# Patient Record
Sex: MALE | Race: WHITE | Employment: FULL TIME | URBAN - METROPOLITAN AREA
[De-identification: names, ages, dates, MRNs, and addresses within clinical notes are randomized per-mention and may not be internally consistent; named-entity substitution may affect disease eponyms.]

---

## 2021-06-20 ENCOUNTER — HOSPITAL ENCOUNTER (EMERGENCY)
Facility: CLINIC | Age: 26
Discharge: HOME | End: 2021-06-20
Attending: EMERGENCY MEDICINE
Payer: COMMERCIAL

## 2021-06-20 ENCOUNTER — APPOINTMENT (OUTPATIENT)
Dept: GENERAL RADIOLOGY | Facility: CLINIC | Age: 26
End: 2021-06-20
Payer: COMMERCIAL

## 2021-06-20 VITALS
TEMPERATURE: 98 F | WEIGHT: 300 LBS | DIASTOLIC BLOOD PRESSURE: 98 MMHG | OXYGEN SATURATION: 100 % | SYSTOLIC BLOOD PRESSURE: 133 MMHG | RESPIRATION RATE: 20 BRPM | HEART RATE: 104 BPM

## 2021-06-20 DIAGNOSIS — S60.222A CONTUSION OF LEFT HAND, INITIAL ENCOUNTER: Primary | ICD-10-CM

## 2021-06-20 PROCEDURE — 73110 X-RAY EXAM OF WRIST: CPT

## 2021-06-20 PROCEDURE — 99284 EMERGENCY DEPT VISIT MOD MDM: CPT

## 2021-06-20 PROCEDURE — 73130 X-RAY EXAM OF HAND: CPT

## 2021-06-20 PROCEDURE — 6370000000 HC RX 637 (ALT 250 FOR IP): Performed by: EMERGENCY MEDICINE

## 2021-06-20 RX ORDER — IBUPROFEN 800 MG/1
800 TABLET ORAL ONCE
Status: COMPLETED | OUTPATIENT
Start: 2021-06-20 | End: 2021-06-20

## 2021-06-20 RX ADMIN — IBUPROFEN 800 MG: 800 TABLET, FILM COATED ORAL at 20:24

## 2021-06-20 ASSESSMENT — PAIN SCALES - GENERAL
PAINLEVEL_OUTOF10: 6
PAINLEVEL_OUTOF10: 3
PAINLEVEL_OUTOF10: 6

## 2021-06-20 ASSESSMENT — ENCOUNTER SYMPTOMS
SORE THROAT: 0
NAUSEA: 0
ABDOMINAL PAIN: 0
DIARRHEA: 0
SHORTNESS OF BREATH: 0
VOMITING: 0

## 2021-06-20 ASSESSMENT — PAIN DESCRIPTION - PROGRESSION: CLINICAL_PROGRESSION: GRADUALLY IMPROVING

## 2021-06-20 ASSESSMENT — PAIN DESCRIPTION - ORIENTATION: ORIENTATION: LEFT

## 2021-06-20 ASSESSMENT — PAIN DESCRIPTION - ONSET: ONSET: SUDDEN

## 2021-06-20 ASSESSMENT — PAIN DESCRIPTION - LOCATION: LOCATION: WRIST;HAND

## 2021-06-20 ASSESSMENT — PAIN DESCRIPTION - PAIN TYPE
TYPE: ACUTE PAIN
TYPE: ACUTE PAIN

## 2021-06-20 ASSESSMENT — PAIN DESCRIPTION - FREQUENCY: FREQUENCY: CONTINUOUS

## 2021-06-20 ASSESSMENT — PAIN DESCRIPTION - DESCRIPTORS: DESCRIPTORS: THROBBING

## 2021-06-21 NOTE — ED PROVIDER NOTES
INITIAL VITALS:  weight is 136.1 kg (300 lb). His oral temperature is 98 °F (36.7 °C). His blood pressure is 133/98 (abnormal) and his pulse is 104. His respiration is 20 and oxygen saturation is 100%. Physical Exam  Constitutional:       General: He is not in acute distress. Appearance: He is well-developed. HENT:      Head: Normocephalic and atraumatic. Right Ear: External ear normal.      Left Ear: External ear normal.   Eyes:      General: Lids are normal.         Right eye: No discharge. Left eye: No discharge. Neck:      Trachea: No tracheal deviation. Cardiovascular:      Rate and Rhythm: Normal rate and regular rhythm. Pulmonary:      Effort: Pulmonary effort is normal.   Abdominal:      Palpations: Abdomen is soft. Tenderness: There is no abdominal tenderness. Musculoskeletal:      Comments: Left dorsal hand has tenderness to the fourth and fifth metacarpals. No obvious deformity. Mild edema to that region. No significant wrist or other hand tenderness. Rest of arm is normal.  Normal distal pulses, motor and sensation. Limited range of motion due to pain. Otherwise unremarkable hand exam.   Skin:     General: Skin is warm and dry. Neurological:      Mental Status: He is alert. GCS: GCS eye subscore is 4. GCS verbal subscore is 5. GCS motor subscore is 6. Psychiatric:         Behavior: Behavior normal.           DIFFERENTIAL DIAGNOSIS/ MDM:     Plan to be to image the hand and wrist.  We will also give ibuprofen.     DIAGNOSTIC RESULTS     EKG: All EKG's are interpreted by the Emergency Department Physician who either signs or Co-signs this chart in the absence of a cardiologist.        RADIOLOGY:   Interpretation per the Radiologist below, if available at the time of this note:  XR WRIST LEFT (MIN 3 VIEWS)   Final Result   No acute bony abnormalities are noted         XR HAND LEFT (MIN 3 VIEWS)   Final Result   No acute bony abnormalities are noted No results found. LABS:  No results found for this visit on 06/20/21. EMERGENCY DEPARTMENT COURSE:     The patient was given the following medications:  Orders Placed This Encounter   Medications    ibuprofen (ADVIL;MOTRIN) tablet 800 mg        Vitals:    Vitals:    06/20/21 2002   BP: (!) 133/98   Pulse: 104   Resp: 20   Temp: 98 °F (36.7 °C)   TempSrc: Oral   SpO2: 100%   Weight: 136.1 kg (300 lb)     -------------------------  BP: (!) 133/98, Temp: 98 °F (36.7 °C), Pulse: 104, Resp: 20      Re-evaluation Notes    Patient doing well on reevaluation. No acute changes. Imaging negative for acute findings of fracture. I feel this is a hand contusion. Advised to use supportive care and NSAIDs and to follow-up with his PCP return sooner if worsening. He is comfortable with the plan. The patient presented with hand pain. A fracture was not detected on X-ray. The wrist and elbow joints were not affected. No skin lesions were seen. There are no signs of compartment syndrome. The pulses are 2/4. The motor is 5/5. The sensation is intact. The patient was advised to return to the Emergency Department for increasing pain, numbness, weakness, or coldness to the extremity. The patient was further instructed to follow up in 2-3 days with their family doctor or orthopedics. The patient voiced understanding of these instructions. The patient understands that at this time there is no evidence for a more malignant underlying process, but the patient also understands that early in the process of an illness or injury, an emergency department workup can be falsely reassuring. Routine discharge counseling was given, and the patient understands that worsening, changing or persistent symptoms should prompt an immediate call or follow up with their primary physician or return to the emergency department. The importance of appropriate follow up was also discussed.   I have reviewed the disposition diagnosis with the patient and or their family/guardian. I have answered their questions and given discharge instructions. They voiced understanding of these instructions and did not have any further questions or complaints. CONSULTS:    None    CRITICAL CARE:     None    PROCEDURES:    None    FINAL IMPRESSION      1. Contusion of left hand, initial encounter          DISPOSITION/PLAN   DISPOSITION Decision To Discharge 06/20/2021 09:26:39 PM      Condition on Disposition    Improved    PATIENT REFERRED TO:  Your doctor    Schedule an appointment as soon as possible for a visit in 3 days        DISCHARGE MEDICATIONS:  There are no discharge medications for this patient.       (Please note that portions of this note were completed with a voice recognition program.  Efforts were made to edit the dictations but occasionally words are mis-transcribed.)    Isaiah Mooney DO, DO  Attending Emergency Physician        Isaiah Mooney DO  06/21/21 0365

## 2021-06-21 NOTE — ED NOTES
Pt. Ambulatory to room # 12 from waiting area, gait steady. Pt. C/o left hand and wrist pain after punching a wall today. Pt. States he has fractured hand in the past from punching a wall. Pt. C/o left hand and left wrist pain. Pt. States he has not taken anything for pain. Pt. Alert and oriented x4. RR equal and non labored. NaD noted. Call light within reach.       Nithin Harris RN  06/20/21 2119

## 2021-07-22 ENCOUNTER — HOSPITAL ENCOUNTER (EMERGENCY)
Facility: CLINIC | Age: 26
Discharge: HOME | End: 2021-07-22
Attending: EMERGENCY MEDICINE
Payer: MEDICARE

## 2021-07-22 ENCOUNTER — APPOINTMENT (OUTPATIENT)
Dept: GENERAL RADIOLOGY | Facility: CLINIC | Age: 26
End: 2021-07-22
Payer: MEDICARE

## 2021-07-22 ENCOUNTER — NURSE TRIAGE (OUTPATIENT)
Dept: OTHER | Facility: CLINIC | Age: 26
End: 2021-07-22

## 2021-07-22 VITALS
BODY MASS INDEX: 33.33 KG/M2 | OXYGEN SATURATION: 97 % | DIASTOLIC BLOOD PRESSURE: 79 MMHG | SYSTOLIC BLOOD PRESSURE: 141 MMHG | TEMPERATURE: 98.4 F | RESPIRATION RATE: 16 BRPM | HEART RATE: 98 BPM | WEIGHT: 225 LBS | HEIGHT: 69 IN

## 2021-07-22 DIAGNOSIS — S46.912A STRAIN OF LEFT SHOULDER, INITIAL ENCOUNTER: Primary | ICD-10-CM

## 2021-07-22 PROCEDURE — 73030 X-RAY EXAM OF SHOULDER: CPT

## 2021-07-22 PROCEDURE — 96372 THER/PROPH/DIAG INJ SC/IM: CPT

## 2021-07-22 PROCEDURE — 99283 EMERGENCY DEPT VISIT LOW MDM: CPT

## 2021-07-22 PROCEDURE — 6360000002 HC RX W HCPCS: Performed by: EMERGENCY MEDICINE

## 2021-07-22 RX ORDER — KETOROLAC TROMETHAMINE 30 MG/ML
30 INJECTION, SOLUTION INTRAMUSCULAR; INTRAVENOUS ONCE
Status: COMPLETED | OUTPATIENT
Start: 2021-07-22 | End: 2021-07-22

## 2021-07-22 RX ORDER — CYCLOBENZAPRINE HCL 10 MG
10 TABLET ORAL 3 TIMES DAILY PRN
Qty: 30 TABLET | Refills: 0 | Status: SHIPPED | OUTPATIENT
Start: 2021-07-22 | End: 2021-08-01

## 2021-07-22 RX ORDER — ORPHENADRINE CITRATE 30 MG/ML
60 INJECTION INTRAMUSCULAR; INTRAVENOUS ONCE
Status: COMPLETED | OUTPATIENT
Start: 2021-07-22 | End: 2021-07-22

## 2021-07-22 RX ADMIN — ORPHENADRINE CITRATE 60 MG: 30 INJECTION INTRAMUSCULAR; INTRAVENOUS at 20:24

## 2021-07-22 RX ADMIN — KETOROLAC TROMETHAMINE 30 MG: 30 INJECTION, SOLUTION INTRAMUSCULAR at 20:24

## 2021-07-22 ASSESSMENT — ENCOUNTER SYMPTOMS
NAUSEA: 0
DIARRHEA: 0
SHORTNESS OF BREATH: 0
VOMITING: 0
ABDOMINAL PAIN: 0
SORE THROAT: 0

## 2021-07-22 ASSESSMENT — PAIN DESCRIPTION - ORIENTATION: ORIENTATION: RIGHT

## 2021-07-22 ASSESSMENT — PAIN SCALES - GENERAL
PAINLEVEL_OUTOF10: 10
PAINLEVEL_OUTOF10: 10

## 2021-07-22 ASSESSMENT — PAIN DESCRIPTION - LOCATION: LOCATION: SHOULDER

## 2021-07-22 ASSESSMENT — PAIN DESCRIPTION - PAIN TYPE: TYPE: ACUTE PAIN

## 2021-07-22 NOTE — TELEPHONE ENCOUNTER
[facility] [employer] [facility]. Brief description of triage: Pt c/o left shoulder pain that has been exacerbated from prior injury 4-5 years ago. Pt states he having severe pain and is unable to move arm without pain. Pt states he took Marijuana and alcohol to help with the pain. Pt has an appt with new PCP on 07/26/21. See assessment below. Triage indicates for patient to be seen today by PCP. Pt advised to be seen in the ED or [facility] today. Care advice provided, patient verbalizes understanding; denies any other questions or concerns; instructed to call back for any new or worsening symptoms. Attention Provider: Thank you for allowing me to participate in the care of your patient. The patient was connected to triage in response to information provided to the ECC. Please do not respond through this encounter as the response is not directed to a shared pool. Reason for Disposition   SEVERE pain (e.g., excruciating, unable to do any normal activities)    Answer Assessment - Initial Assessment Questions  1. ONSET: \"When did the pain start? \"      Yesterday    2. LOCATION: \"Where is the pain located? \"      Left shoulder    3. PAIN: \"How bad is the pain? \" (Scale 1-10; or mild, moderate, severe)    - MILD (1-3): doesn't interfere with normal activities    - MODERATE (4-7): interferes with normal activities (e.g., work or school) or awakens from sleep    - SEVERE (8-10): excruciating pain, unable to do any normal activities, unable to move arm at all due to pain      10    4. WORK OR EXERCISE: \"Has there been any recent work or exercise that involved this part of the body? \"      Yesterday during fishing    5. CAUSE: \"What do you think is causing the shoulder pain? \"      Pt states had 4-5 years ago injured it changing a tire    6. OTHER SYMPTOMS: \"Do you have any other symptoms? \" (e.g., neck pain, swelling, rash, fever, numbness, weakness)     Pain in left arm    7. PREGNANCY: \"Is there any chance you are pregnant? \" \"When was your last menstrual period? \"      na    Protocols used: SHOULDER PAIN-ADULT-[place]

## 2021-07-22 NOTE — ED NOTES
Mode of arrival (squad #, walk in, police, etc) : walk in- dropped off        Chief complaint(s): left shoulder pain          Arrival Note (brief scenario, treatment PTA, etc). : Pt reports that 4-5 years ago he torn his left rotator cuff, reports that a couple days ago he re-injured his arm while fishing. Pt reports that the pain became to bad and reports inability to move his left arm.         C= \"Have you ever felt that you should Cut down on your drinking? \"  No  A= \"Have people Annoyed you by criticizing your drinking? \"  No  G= \"Have you ever felt bad or Guilty about your drinking? \"  No  E= \"Have you ever had a drink as an Eye-opener first thing in the morning to steady your nerves or to help a hangover? \"  No      Deferred []      Reason for deferring: N/A    *If yes to two or more: probable alcohol abuse. Susan You RN  07/22/21 4052

## 2021-07-23 NOTE — ED PROVIDER NOTES
[facility]  Phone: [phone] [facility] [place],[address]          Pt Name: Arlen Monzon  MRN: [id number]  [place] 1995  Date of evaluation: 7/22/2021      CHIEF COMPLAINT       Chief Complaint   Patient presents with    Shoulder Pain     left shoulder        HISTORY OF PRESENT ILLNESS       Arlen Monzon is a 27 y.o. male who presents with left shoulder pain. Had a rotator cuff injury about 5 years ago and was told he needed surgery but he did not have insurance so it was never repaired. He states he sometimes has intermittent flareups of pain with the shoulder and this is one of them. He did arrive with a left shoulder sling. He drinks some alcohol at home and smoked marijuana to help with the pain. He did not take any NSAIDs because he did not have any available. Denies obvious injury other than possible overuse injury. Denies other symptoms, injuries or concerns. REVIEW OF SYSTEMS       Review of Systems   Constitutional: Negative for chills and fever. HENT: Negative for sore throat. Respiratory: Negative for shortness of breath. Cardiovascular: Negative for chest pain. Gastrointestinal: Negative for abdominal pain, diarrhea, nausea and vomiting. Musculoskeletal: Negative for neck pain. Skin: Negative for rash. Neurological: Negative for weakness and numbness. PAST MEDICAL HISTORY    has no past medical history on file. SURGICAL HISTORY      has a past surgical history that includes Boswell tooth extraction. CURRENT MEDICATIONS       Previous Medications    No medications on file       ALLERGIES     is allergic to pcn [penicillins]. FAMILY HISTORY     has no family status information on file. family history is not on file. SOCIAL HISTORY      reports that he has been smoking e-cigarettes and cigarettes. He has been smoking about 0.75 packs per day.  He has never used smokeless tobacco. He reports current alcohol use. He reports current drug use. Drug: Marijuana. PHYSICAL EXAM     INITIAL VITALS:  height is 5' 9\" (1.753 m) and weight is 102.1 kg (225 lb). His oral temperature is 98.4 °F (36.9 °C). His blood pressure is 141/79 (abnormal) and his pulse is 98. His respiration is 16 and oxygen saturation is 97%. Physical Exam  Constitutional:       General: He is not in acute distress. Appearance: He is well-developed. HENT:      Head: Normocephalic and atraumatic. Right Ear: External ear normal.      Left Ear: External ear normal.   Eyes:      General: Lids are normal.         Right eye: No discharge. Left eye: No discharge. Neck:      Trachea: No tracheal deviation. Cardiovascular:      Rate and Rhythm: Normal rate and regular rhythm. Pulmonary:      Effort: Pulmonary effort is normal.   Abdominal:      Palpations: Abdomen is soft. Tenderness: There is no abdominal tenderness. Musculoskeletal:      Comments: Limited range of motion to the left shoulder due to discomfort. He does have normal axillary nerve function. Normal distal pulses, motor and sensation. No evidence of neurovascular compromise distally. Neck and back exam are unremarkable. Rest of musculoskeletal exam is within normal limits. Skin:     General: Skin is warm and dry. Neurological:      Mental Status: He is alert. GCS: GCS eye subscore is 4. GCS verbal subscore is 5. GCS motor subscore is 6. Psychiatric:         Behavior: Behavior normal.           DIFFERENTIAL DIAGNOSIS/ MDM:     Plan will be to image the shoulder and refer to orthopedics. DIAGNOSTIC RESULTS     EKG: All EKG's are interpreted by the Emergency Department Physician who either signs or Co-signs this chart in the absence of a cardiologist.        RADIOLOGY:   Interpretation per the Radiologist below, if available at the time of this note:  XR SHOULDER LEFT (MIN 2 VIEWS)   Final Result   No acute abnormality. No results found. LABS:  No results found for this visit on 07/22/21. EMERGENCY DEPARTMENT COURSE:     The patient was given the following medications:  Orders Placed This Encounter   Medications    ketorolac (TORADOL) injection 30 mg    orphenadrine (NORFLEX) injection 60 mg    diclofenac (VOLTAREN) 50 MG EC tablet     Sig: Take 1 tablet by mouth 2 times daily     Dispense:  30 tablet     Refill:  0    cyclobenzaprine (FLEXERIL) 10 MG tablet     Sig: Take 1 tablet by mouth 3 times daily as needed for Muscle spasms     Dispense:  30 tablet     Refill:  0        Vitals:    Vitals:    07/22/21 1946 07/22/21 1947   BP: (!) 141/79    Pulse: 98    Resp:  16   Temp: 98.4 °F (36.9 °C)    TempSrc: Oral    SpO2: 97%    Weight: 102.1 kg (225 lb)    Height: 5' 9\" (1.753 m)      -------------------------  BP: (!) 141/79, Temp: 98.4 °F (36.9 °C), Pulse: 98, Resp: 16      Re-evaluation Notes    Patient doing well on reevaluation. X-ray negative. Will discharge home with orthopedic follow-up. He was advised to follow-up with orthopedics or return right away if worsening or for new or concerning symptoms. He is comfortable with the plan. He already has his own sling. Prescribed muscle relaxers and Voltaren. He is comfortable with this plan. The patient presented with shoulder pain. A fracture was not detected on X-ray. The elbow and wrist joints were not affected. No skin lesions were seen. There are no signs of compartment syndrome. The pulses are 2/4. The motor is 5/5. The sensation is intact. The patient was advised to return to the Emergency Department for increasing pain, numbness, weakness, or coldness to the extremity. The patient was further instructed to follow up in 2-3 days with their family doctor or orthopedics. The patient voiced understanding of these instructions.     The patient understands that at this time there is no evidence for a more malignant underlying process, but the patient also understands that early in the process of an illness or injury, an emergency department workup can be falsely reassuring. Routine discharge counseling was given, and the patient understands that worsening, changing or persistent symptoms should prompt an immediate call or follow up with their primary physician or return to the emergency department. The importance of appropriate follow up was also discussed. I have reviewed the disposition diagnosis with the patient and or their family/guardian. I have answered their questions and given discharge instructions. They voiced understanding of these instructions and did not have any further questions or complaints. CONSULTS:    None    CRITICAL CARE:     None    PROCEDURES:    None    FINAL IMPRESSION      1.  Strain of left shoulder, initial encounter          DISPOSITION/PLAN   DISPOSITION Decision To Discharge 07/22/2021 09:05:48 PM      Condition on Disposition    Improved    PATIENT REFERRED TO:  Fabienne Tabares   [phone]    Schedule an appointment as soon as possible for a visit in 3 days        DISCHARGE MEDICATIONS:  New Prescriptions    CYCLOBENZAPRINE (FLEXERIL) 10 MG TABLET    Take 1 tablet by mouth 3 times daily as needed for Muscle spasms    DICLOFENAC (VOLTAREN) 50 MG EC TABLET    Take 1 tablet by mouth 2 times daily       (Please note that portions of this note were completed with a voice recognition program.  Efforts were made to edit the dictations but occasionally words are mis-transcribed.)    Matthew Martinez DO, DO  Attending Emergency Physician       Matthew Martinez DO  07/22/21 7784

## 2021-07-26 ENCOUNTER — HOSPITAL ENCOUNTER (OUTPATIENT)
Age: 26
Setting detail: SPECIMEN
Discharge: HOME | End: 2021-07-26
Payer: MEDICARE

## 2021-07-26 ENCOUNTER — OFFICE VISIT (OUTPATIENT)
Dept: FAMILY MEDICINE CLINIC | Age: 26
End: 2021-07-26
Payer: MEDICARE

## 2021-07-26 VITALS
HEART RATE: 95 BPM | WEIGHT: 234 LBS | OXYGEN SATURATION: 99 % | HEIGHT: 69 IN | SYSTOLIC BLOOD PRESSURE: 128 MMHG | BODY MASS INDEX: 34.66 KG/M2 | DIASTOLIC BLOOD PRESSURE: 76 MMHG | RESPIRATION RATE: 15 BRPM | TEMPERATURE: 97.3 F

## 2021-07-26 DIAGNOSIS — Z00.00 ENCOUNTER FOR MEDICAL EXAMINATION TO ESTABLISH CARE: ICD-10-CM

## 2021-07-26 DIAGNOSIS — J45.20 MILD INTERMITTENT ASTHMA WITHOUT COMPLICATION: Primary | ICD-10-CM

## 2021-07-26 DIAGNOSIS — Z71.6 ENCOUNTER FOR SMOKING CESSATION COUNSELING: ICD-10-CM

## 2021-07-26 DIAGNOSIS — M75.112 NONTRAUMATIC INCOMPLETE TEAR OF LEFT ROTATOR CUFF: ICD-10-CM

## 2021-07-26 LAB
HCT VFR BLD CALC: 48 % (ref 40.7–50.3)
HEMOGLOBIN: 15.5 G/DL (ref 13–17)
MCH RBC QN AUTO: 32.2 PG (ref 25.2–33.5)
MCHC RBC AUTO-ENTMCNC: 32.3 G/DL (ref 28.4–34.8)
MCV RBC AUTO: 99.6 FL (ref 82.6–102.9)
NRBC AUTOMATED: 0 PER 100 WBC
PDW BLD-RTO: 13.5 % (ref 11.8–14.4)
PLATELET # BLD: 260 K/UL (ref 138–453)
PMV BLD AUTO: 10.3 FL (ref 8.1–13.5)
RBC # BLD: 4.82 M/UL (ref 4.21–5.77)
WBC # BLD: 7.5 K/UL (ref 3.5–11.3)

## 2021-07-26 PROCEDURE — 99204 OFFICE O/P NEW MOD 45 MIN: CPT | Performed by: STUDENT IN AN ORGANIZED HEALTH CARE EDUCATION/TRAINING PROGRAM

## 2021-07-26 PROCEDURE — G8417 CALC BMI ABV UP PARAM F/U: HCPCS | Performed by: STUDENT IN AN ORGANIZED HEALTH CARE EDUCATION/TRAINING PROGRAM

## 2021-07-26 PROCEDURE — G8427 DOCREV CUR MEDS BY ELIG CLIN: HCPCS | Performed by: STUDENT IN AN ORGANIZED HEALTH CARE EDUCATION/TRAINING PROGRAM

## 2021-07-26 PROCEDURE — 4004F PT TOBACCO SCREEN RCVD TLK: CPT | Performed by: STUDENT IN AN ORGANIZED HEALTH CARE EDUCATION/TRAINING PROGRAM

## 2021-07-26 RX ORDER — BUDESONIDE AND FORMOTEROL FUMARATE DIHYDRATE 160; 4.5 UG/1; UG/1
2 AEROSOL RESPIRATORY (INHALATION) 2 TIMES DAILY
Qty: 3 INHALER | Refills: 1 | Status: SHIPPED | OUTPATIENT
Start: 2021-07-26

## 2021-07-26 RX ORDER — ALBUTEROL SULFATE 90 UG/1
2 AEROSOL, METERED RESPIRATORY (INHALATION) EVERY 6 HOURS PRN
COMMUNITY
End: 2021-07-26 | Stop reason: SDUPTHER

## 2021-07-26 RX ORDER — ALBUTEROL SULFATE 90 UG/1
2 AEROSOL, METERED RESPIRATORY (INHALATION) EVERY 6 HOURS PRN
Qty: 1 INHALER | Refills: 1 | Status: SHIPPED | OUTPATIENT
Start: 2021-07-26 | End: 2022-07-26

## 2021-07-26 RX ORDER — VARENICLINE TARTRATE
KIT
Qty: 1 BOX | Refills: 1 | Status: SHIPPED | OUTPATIENT
Start: 2021-07-26

## 2021-07-26 SDOH — ECONOMIC STABILITY: HOUSING INSECURITY
IN THE LAST 12 MONTHS, WAS THERE A TIME WHEN YOU DID NOT HAVE A STEADY PLACE TO SLEEP OR SLEPT IN A SHELTER (INCLUDING NOW)?: NO

## 2021-07-26 SDOH — ECONOMIC STABILITY: FOOD INSECURITY: WITHIN THE PAST 12 MONTHS, YOU WORRIED THAT YOUR FOOD WOULD RUN OUT BEFORE YOU GOT MONEY TO BUY MORE.: NEVER TRUE

## 2021-07-26 SDOH — ECONOMIC STABILITY: TRANSPORTATION INSECURITY
IN THE PAST 12 MONTHS, HAS THE LACK OF TRANSPORTATION KEPT YOU FROM MEDICAL APPOINTMENTS OR FROM GETTING MEDICATIONS?: NO

## 2021-07-26 SDOH — HEALTH STABILITY: PHYSICAL HEALTH: ON AVERAGE, HOW MANY MINUTES DO YOU ENGAGE IN EXERCISE AT THIS LEVEL?: 0 MIN

## 2021-07-26 SDOH — ECONOMIC STABILITY: INCOME INSECURITY: IN THE LAST 12 MONTHS, WAS THERE A TIME WHEN YOU WERE NOT ABLE TO PAY THE MORTGAGE OR RENT ON TIME?: NO

## 2021-07-26 SDOH — ECONOMIC STABILITY: TRANSPORTATION INSECURITY
IN THE PAST 12 MONTHS, HAS LACK OF TRANSPORTATION KEPT YOU FROM MEETINGS, WORK, OR FROM GETTING THINGS NEEDED FOR DAILY LIVING?: NO

## 2021-07-26 SDOH — HEALTH STABILITY: PHYSICAL HEALTH: ON AVERAGE, HOW MANY DAYS PER WEEK DO YOU ENGAGE IN MODERATE TO STRENUOUS EXERCISE (LIKE A BRISK WALK)?: 0 DAYS

## 2021-07-26 SDOH — ECONOMIC STABILITY: FOOD INSECURITY: WITHIN THE PAST 12 MONTHS, THE FOOD YOU BOUGHT JUST DIDN'T LAST AND YOU DIDN'T HAVE MONEY TO GET MORE.: NEVER TRUE

## 2021-07-26 ASSESSMENT — SOCIAL DETERMINANTS OF HEALTH (SDOH)
HOW HARD IS IT FOR YOU TO PAY FOR THE VERY BASICS LIKE FOOD, HOUSING, MEDICAL CARE, AND HEATING?: NOT HARD AT ALL
HOW OFTEN DO YOU ATTENT MEETINGS OF THE CLUB OR ORGANIZATION YOU BELONG TO?: NEVER
WITHIN THE LAST YEAR, HAVE YOU BEEN AFRAID OF YOUR PARTNER OR EX-PARTNER?: NO
WITHIN THE LAST YEAR, HAVE YOU BEEN KICKED, HIT, SLAPPED, OR OTHERWISE PHYSICALLY HURT BY YOUR PARTNER OR EX-PARTNER?: NO
HOW OFTEN DO YOU ATTEND CHURCH OR RELIGIOUS SERVICES?: NEVER
HOW OFTEN DO YOU GET TOGETHER WITH FRIENDS OR RELATIVES?: ONCE A WEEK
WITHIN THE LAST YEAR, HAVE YOU BEEN HUMILIATED OR EMOTIONALLY ABUSED IN OTHER WAYS BY YOUR PARTNER OR EX-PARTNER?: NO
IN A TYPICAL WEEK, HOW MANY TIMES DO YOU TALK ON THE PHONE WITH FAMILY, FRIENDS, OR NEIGHBORS?: ONCE A WEEK
WITHIN THE LAST YEAR, HAVE TO BEEN RAPED OR FORCED TO HAVE ANY KIND OF SEXUAL ACTIVITY BY YOUR PARTNER OR EX-PARTNER?: NO
DO YOU BELONG TO ANY CLUBS OR ORGANIZATIONS SUCH AS CHURCH GROUPS UNIONS, FRATERNAL OR ATHLETIC GROUPS, OR SCHOOL GROUPS?: NO

## 2021-07-26 ASSESSMENT — LIFESTYLE VARIABLES
HOW OFTEN DO YOU HAVE A DRINK CONTAINING ALCOHOL: 2-4 TIMES A MONTH
HOW MANY STANDARD DRINKS CONTAINING ALCOHOL DO YOU HAVE ON A TYPICAL DAY: 1 OR 2

## 2021-07-26 ASSESSMENT — PATIENT HEALTH QUESTIONNAIRE - PHQ9
SUM OF ALL RESPONSES TO PHQ QUESTIONS 1-9: 0
SUM OF ALL RESPONSES TO PHQ9 QUESTIONS 1 & 2: 0
2. FEELING DOWN, DEPRESSED OR HOPELESS: NOT AT ALL
DEPRESSION UNABLE TO ASSESS: YES
2. FEELING DOWN, DEPRESSED OR HOPELESS: 0
SUM OF ALL RESPONSES TO PHQ9 QUESTIONS 1 & 2: 0
1. LITTLE INTEREST OR PLEASURE IN DOING THINGS: 0
1. LITTLE INTEREST OR PLEASURE IN DOING THINGS: NOT AT ALL
SUM OF ALL RESPONSES TO PHQ QUESTIONS 1-9: 0
SUM OF ALL RESPONSES TO PHQ QUESTIONS 1-9: 0

## 2021-07-26 NOTE — PROGRESS NOTES
2021    Jose Aggarwal (:  1995) is a 27 y.o. male, here for evaluation of the following medical concerns:    HPI  Asthma:  Current treatment includes inhaled steroids. Using preventive medication(s) consistently: no- as needed. Residual symptoms: none. Patient denies any other symptoms. He requires his rescue inhaler 1 time(s) per week. Presents as new patient    Has hx of partially torn rotator cuff on the left    Has not had PT    No MRI      Review of Systems 12 pt ROS per HPI nighttime cough, some wheezing    Prior to Visit Medications    Medication Sig Taking? Authorizing Provider   albuterol sulfate HFA (VENTOLIN HFA) 108 (90 Base) MCG/ACT inhaler Inhale 2 puffs into the lungs every 6 hours as needed for Wheezing or Shortness of Breath Yes Renetta Mann MD   Spacer/Aero-Holding Chambers JORGE 1 Device by Does not apply route daily as needed (with inhaler) Yes Renetta Mann MD   budesonide-formoterol (SYMBICORT) 160-4.5 MCG/ACT AERO Inhale 2 puffs into the lungs 2 times daily Yes Renetta Mann MD   varenicline (CHANTIX STARTING MONTH ) 0.5 MG X 11 & 1 MG X 42 tablet Take by mouth.  Yes Renetta Mann MD   diclofenac (VOLTAREN) 50 MG EC tablet Take 1 tablet by mouth 2 times daily Yes Chano Shook DO   cyclobenzaprine (FLEXERIL) 10 MG tablet Take 1 tablet by mouth 3 times daily as needed for Muscle spasms Yes Donovan Shook DO        Allergies   Allergen Reactions    Pcn [Penicillins] Anaphylaxis       Past Medical History:   Diagnosis Date    Asthma        Past Surgical History:   Procedure Laterality Date    WISDOM TOOTH EXTRACTION         Social History     Socioeconomic History    Marital status: Single     Spouse name: Not on file    Number of children: Not on file    Years of education: Not on file    Highest education level: Not on file   Occupational History    Not on file   Tobacco Use    Smoking status: Current Every Day Smoker Packs/day: 0.75     Years: 17.00     Pack years: 12.75     Types: E-Cigarettes, Cigarettes     Start date: 2005    Smokeless tobacco: Never Used   Vaping Use    Vaping Use: Every day    Start date: 7/1/2005    Substances: Nicotine, THC    Passive vaping exposure Yes   Substance and Sexual Activity    Alcohol use: Yes     Comment: 1-3 times per week    Drug use: Yes     Frequency: 7.0 times per week     Types: Marijuana    Sexual activity: Not Currently   Other Topics Concern    Not on file   Social History Narrative    Not on file     Social Determinants of Health     Financial Resource Strain: Low Risk     Difficulty of Paying Living Expenses: Not hard at all   Food Insecurity: No Food Insecurity    Worried About Running Out of Food in the Last Year: Never true    Forrest of Food in the Last Year: Never true   Transportation Needs: No Transportation Needs    Lack of Transportation (Medical): No    Lack of Transportation (Non-Medical):  No   Physical Activity: Inactive    Days of Exercise per Week: 0 days    Minutes of Exercise per Session: 0 min   Stress: No Stress Concern Present    Feeling of Stress : Not at all   Social Connections: Socially Isolated    Frequency of Communication with Friends and Family: Once a week    Frequency of Social Gatherings with Friends and Family: Once a week    Attends Episcopal Services: Never    Active Member of Clubs or Organizations: No    Attends Club or Organization Meetings: Never    Marital Status:    Intimate Partner Violence: Not At Risk    Fear of Current or Ex-Partner: No    Emotionally Abused: No    Physically Abused: No    Sexually Abused: No        Family History   Problem Relation Age of Onset    Heart Disease Mother     Diabetes Mother     Kidney Disease Mother     Heart Disease Father     Diabetes Father        Vitals:    07/26/21 1436   BP: 128/76   Site: Right Upper Arm   Position: Sitting   Cuff Size: Large Adult   Pulse: 95 Resp: 15   Temp: 97.3 °F (36.3 °C)   TempSrc: Temporal   SpO2: 99%   Weight: 234 lb (106.1 kg)   Height: 5' 9\" (1.753 m)     Estimated body mass index is 34.56 kg/m² as calculated from the following:    Height as of this encounter: 5' 9\" (1.753 m). Weight as of this encounter: 234 lb (106.1 kg). Physical Exam  Vitals and nursing note reviewed. Constitutional:       Appearance: Normal appearance. HENT:      Head: Normocephalic and atraumatic. Eyes:      Extraocular Movements: Extraocular movements intact. Cardiovascular:      Rate and Rhythm: Normal rate and regular rhythm. Pulses: Normal pulses. Heart sounds: Normal heart sounds. Pulmonary:      Effort: Pulmonary effort is normal.      Breath sounds: Normal breath sounds. Abdominal:      General: Abdomen is flat. Palpations: Abdomen is soft. Musculoskeletal:         General: Normal range of motion. Cervical back: Normal range of motion and neck supple. Skin:     General: Skin is warm. Neurological:      General: No focal deficit present. Mental Status: He is alert and oriented to person, place, and time. ASSESSMENT/PLAN:  1. Mild intermittent asthma without complication    - albuterol sulfate HFA (VENTOLIN HFA) 108 (90 Base) MCG/ACT inhaler; Inhale 2 puffs into the lungs every 6 hours as needed for Wheezing or Shortness of Breath  Dispense: 1 Inhaler; Refill: 1  - Spacer/Aero-Holding Chambers JORGE; 1 Device by Does not apply route daily as needed (with inhaler)  Dispense: 1 Device; Refill: 0  - budesonide-formoterol (SYMBICORT) 160-4.5 MCG/ACT AERO; Inhale 2 puffs into the lungs 2 times daily  Dispense: 3 Inhaler; Refill: 1    2. Nontraumatic incomplete tear of left rotator cuff    - External Referral To Physical Therapy    3. Encounter for smoking cessation counseling    - varenicline (CHANTIX STARTING MONTH PAK) 0.5 MG X 11 & 1 MG X 42 tablet; Take by mouth. Dispense: 1 box; Refill: 1    4.  Encounter for medical examination to establish care    - Basic Metabolic Panel; Future  - CBC; Future    PT link rotator cuff  Chantix ordered  Stop vaping  Less unhealthy food - more salads - exercise  Symbicort added as control therapy  Once he quits smoking breathing status will improve  May add montelukast in winter    No follow-ups on file. An  electronic signature was used to authenticate this note.     --Chikis Yeboah MD on 7/26/2021 at 3:04 PM

## 2021-07-27 LAB
ANION GAP SERPL CALCULATED.3IONS-SCNC: 15 MMOL/L (ref 9–17)
BUN BLDV-MCNC: 16 MG/DL (ref 6–20)
BUN/CREAT BLD: NORMAL (ref 9–20)
CALCIUM SERPL-MCNC: 9.9 MG/DL (ref 8.6–10.4)
CHLORIDE BLD-SCNC: 102 MMOL/L (ref 98–107)
CO2: 21 MMOL/L (ref 20–31)
CREAT SERPL-MCNC: 0.99 MG/DL (ref 0.7–1.2)
GFR AFRICAN AMERICAN: >60 ML/MIN
GFR NON-AFRICAN AMERICAN: >60 ML/MIN
GFR SERPL CREATININE-BSD FRML MDRD: NORMAL ML/MIN/{1.73_M2}
GFR SERPL CREATININE-BSD FRML MDRD: NORMAL ML/MIN/{1.73_M2}
GLUCOSE BLD-MCNC: 79 MG/DL (ref 70–99)
POTASSIUM SERPL-SCNC: 5.2 MMOL/L (ref 3.7–5.3)
SODIUM BLD-SCNC: 138 MMOL/L (ref 135–144)

## 2021-08-28 ENCOUNTER — APPOINTMENT (OUTPATIENT)
Dept: GENERAL RADIOLOGY | Facility: CLINIC | Age: 26
End: 2021-08-28
Payer: MEDICARE

## 2021-08-28 ENCOUNTER — HOSPITAL ENCOUNTER (EMERGENCY)
Facility: CLINIC | Age: 26
Discharge: HOME | End: 2021-08-28
Attending: EMERGENCY MEDICINE
Payer: MEDICARE

## 2021-08-28 VITALS
SYSTOLIC BLOOD PRESSURE: 151 MMHG | DIASTOLIC BLOOD PRESSURE: 76 MMHG | TEMPERATURE: 98 F | OXYGEN SATURATION: 98 % | WEIGHT: 240 LBS | BODY MASS INDEX: 35.55 KG/M2 | HEART RATE: 110 BPM | RESPIRATION RATE: 18 BRPM | HEIGHT: 69 IN

## 2021-08-28 DIAGNOSIS — S93.602A SPRAIN OF LEFT FOOT, INITIAL ENCOUNTER: Primary | ICD-10-CM

## 2021-08-28 PROCEDURE — 73630 X-RAY EXAM OF FOOT: CPT

## 2021-08-28 PROCEDURE — 99282 EMERGENCY DEPT VISIT SF MDM: CPT

## 2021-08-28 ASSESSMENT — PAIN SCALES - GENERAL: PAINLEVEL_OUTOF10: 9
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
## 2022-05-04 ENCOUNTER — APPOINTMENT (OUTPATIENT)
Dept: GENERAL RADIOLOGY | Facility: CLINIC | Age: 27
End: 2022-05-04
Payer: MEDICARE

## 2022-05-04 ENCOUNTER — HOSPITAL ENCOUNTER (EMERGENCY)
Facility: CLINIC | Age: 27
Discharge: HOME | End: 2022-05-04
Attending: EMERGENCY MEDICINE
Payer: MEDICARE

## 2022-05-04 VITALS
OXYGEN SATURATION: 97 % | WEIGHT: 250 LBS | DIASTOLIC BLOOD PRESSURE: 96 MMHG | HEIGHT: 69 IN | BODY MASS INDEX: 37.03 KG/M2 | SYSTOLIC BLOOD PRESSURE: 130 MMHG | RESPIRATION RATE: 16 BRPM | TEMPERATURE: 98.1 F | HEART RATE: 99 BPM
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
## 2022-05-04 DIAGNOSIS — M25.512 LEFT SHOULDER PAIN, UNSPECIFIED CHRONICITY: ICD-10-CM

## 2022-05-04 PROCEDURE — 6360000002 HC RX W HCPCS: Performed by: EMERGENCY MEDICINE

## 2022-05-04 PROCEDURE — 96372 THER/PROPH/DIAG INJ SC/IM: CPT

## 2022-05-04 PROCEDURE — 99284 EMERGENCY DEPT VISIT MOD MDM: CPT

## 2022-05-04 PROCEDURE — 73030 X-RAY EXAM OF SHOULDER: CPT

## 2022-05-04 RX ORDER — KETOROLAC TROMETHAMINE 30 MG/ML
30 INJECTION, SOLUTION INTRAMUSCULAR; INTRAVENOUS ONCE
Status: COMPLETED | OUTPATIENT
Start: 2022-05-04 | End: 2022-05-04

## 2022-05-04 RX ORDER — PREDNISONE 10 MG/1
10 TABLET ORAL SEE ADMIN INSTRUCTIONS
Qty: 17 TABLET | Refills: 0 | Status: SHIPPED | OUTPATIENT
Start: 2022-05-04 | End: 2022-05-10

## 2022-05-04 RX ORDER — KETOROLAC TROMETHAMINE 10 MG/1
10 TABLET, FILM COATED ORAL EVERY 6 HOURS PRN
Qty: 20 TABLET | Refills: 0 | Status: SHIPPED | OUTPATIENT
Start: 2022-05-04

## 2022-05-04 RX ADMIN — KETOROLAC TROMETHAMINE 30 MG: 30 INJECTION, SOLUTION INTRAMUSCULAR at 18:36

## 2022-05-04 ASSESSMENT — PAIN - FUNCTIONAL ASSESSMENT: PAIN_FUNCTIONAL_ASSESSMENT: 0-10

## 2022-05-04 ASSESSMENT — PAIN SCALES - GENERAL
PAINLEVEL_OUTOF10: 7
PAINLEVEL_OUTOF10: 7

## 2022-05-04 ASSESSMENT — PAIN DESCRIPTION - DESCRIPTORS: DESCRIPTORS: ACHING

## 2022-05-04 ASSESSMENT — PAIN DESCRIPTION - LOCATION: LOCATION: SHOULDER
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
## 2023-02-01 ENCOUNTER — HOSPITAL ENCOUNTER (EMERGENCY)
Age: 28
Discharge: HOME | End: 2023-02-01
Attending: EMERGENCY MEDICINE
Payer: MEDICAID

## 2023-02-01 ENCOUNTER — APPOINTMENT (OUTPATIENT)
Dept: CT IMAGING | Age: 28
End: 2023-02-01
Payer: MEDICAID

## 2023-02-01 VITALS
HEIGHT: 69 IN | RESPIRATION RATE: 16 BRPM | BODY MASS INDEX: 35.99 KG/M2 | WEIGHT: 243 LBS | OXYGEN SATURATION: 100 % | DIASTOLIC BLOOD PRESSURE: 77 MMHG | HEART RATE: 77 BPM | TEMPERATURE: 97.7 F | SYSTOLIC BLOOD PRESSURE: 134 MMHG

## 2023-02-01 DIAGNOSIS — R10.32 LEFT LOWER QUADRANT ABDOMINAL PAIN: Primary | ICD-10-CM

## 2023-02-01 LAB
ABSOLUTE EOS #: 0.1 K/UL (ref 0–0.4)
ABSOLUTE LYMPH #: 2.3 K/UL (ref 1–4.8)
ABSOLUTE MONO #: 0.6 K/UL (ref 0.1–1.2)
ALBUMIN SERPL-MCNC: 4.8 G/DL (ref 3.5–5.2)
ALBUMIN/GLOBULIN RATIO: 1.7 (ref 1–2.5)
ALP SERPL-CCNC: 65 U/L (ref 40–129)
ALT SERPL-CCNC: 43 U/L (ref 5–41)
ANION GAP SERPL CALCULATED.3IONS-SCNC: 15 MMOL/L (ref 9–17)
AST SERPL-CCNC: 24 U/L
BASOPHILS # BLD: 1 % (ref 0–2)
BASOPHILS ABSOLUTE: 0.1 K/UL (ref 0–0.2)
BILIRUB SERPL-MCNC: 0.5 MG/DL (ref 0.3–1.2)
BILIRUBIN URINE: NEGATIVE
BUN SERPL-MCNC: 17 MG/DL (ref 6–20)
CALCIUM SERPL-MCNC: 9.5 MG/DL (ref 8.6–10.4)
CHLORIDE SERPL-SCNC: 99 MMOL/L (ref 98–107)
CO2 SERPL-SCNC: 23 MMOL/L (ref 20–31)
COLOR: YELLOW
COMMENT UA: NORMAL
CREAT SERPL-MCNC: 0.82 MG/DL (ref 0.7–1.2)
EOSINOPHILS RELATIVE PERCENT: 1 % (ref 1–4)
GFR SERPL CREATININE-BSD FRML MDRD: >60 ML/MIN/1.73M2
GLUCOSE SERPL-MCNC: 103 MG/DL (ref 70–99)
GLUCOSE UR STRIP.AUTO-MCNC: NEGATIVE MG/DL
HCT VFR BLD AUTO: 46.4 % (ref 41–53)
HGB BLD-MCNC: 16.1 G/DL (ref 13.5–17.5)
KETONES UR STRIP.AUTO-MCNC: NEGATIVE MG/DL
LEUKOCYTE ESTERASE UR QL STRIP.AUTO: NEGATIVE
LIPASE SERPL-CCNC: 33 U/L (ref 13–60)
LYMPHOCYTES # BLD: 25 % (ref 24–44)
MAGNESIUM SERPL-MCNC: 2.1 MG/DL (ref 1.6–2.6)
MCH RBC QN AUTO: 31.2 PG (ref 26–34)
MCHC RBC AUTO-ENTMCNC: 34.8 G/DL (ref 31–37)
MCV RBC AUTO: 89.8 FL (ref 80–100)
MONOCYTES # BLD: 7 % (ref 2–11)
NITRITE UR QL STRIP.AUTO: NEGATIVE
PDW BLD-RTO: 13.3 % (ref 12.5–15.4)
PLATELET # BLD AUTO: 283 K/UL (ref 140–450)
PMV BLD AUTO: 7.9 FL (ref 6–12)
POTASSIUM SERPL-SCNC: 3.5 MMOL/L (ref 3.7–5.3)
PROT SERPL-MCNC: 7.7 G/DL (ref 6.4–8.3)
PROT UR STRIP.AUTO-MCNC: 6.5 MG/DL (ref 5–8)
PROT UR STRIP.AUTO-MCNC: NEGATIVE MG/DL
RBC # BLD: 5.16 M/UL (ref 4.5–5.9)
SEG NEUTROPHILS: 66 % (ref 36–66)
SEGMENTED NEUTROPHILS ABSOLUTE COUNT: 6.2 K/UL (ref 1.8–7.7)
SODIUM SERPL-SCNC: 137 MMOL/L (ref 135–144)
SPECIFIC GRAVITY UA: 1.02 (ref 1–1.03)
TURBIDITY: CLEAR
URINE HGB: NEGATIVE
UROBILINOGEN, URINE: NORMAL
WBC # BLD AUTO: 9.4 K/UL (ref 3.5–11)

## 2023-02-01 PROCEDURE — A4216 STERILE WATER/SALINE, 10 ML: HCPCS | Performed by: EMERGENCY MEDICINE

## 2023-02-01 PROCEDURE — 36415 COLL VENOUS BLD VENIPUNCTURE: CPT

## 2023-02-01 PROCEDURE — 74176 CT ABD & PELVIS W/O CONTRAST: CPT

## 2023-02-01 PROCEDURE — 6360000002 HC RX W HCPCS: Performed by: EMERGENCY MEDICINE

## 2023-02-01 PROCEDURE — 83735 ASSAY OF MAGNESIUM: CPT

## 2023-02-01 PROCEDURE — 96375 TX/PRO/DX INJ NEW DRUG ADDON: CPT

## 2023-02-01 PROCEDURE — 81003 URINALYSIS AUTO W/O SCOPE: CPT

## 2023-02-01 PROCEDURE — 83690 ASSAY OF LIPASE: CPT

## 2023-02-01 PROCEDURE — 85025 COMPLETE CBC W/AUTO DIFF WBC: CPT

## 2023-02-01 PROCEDURE — 96374 THER/PROPH/DIAG INJ IV PUSH: CPT

## 2023-02-01 PROCEDURE — 2500000003 HC RX 250 WO HCPCS: Performed by: EMERGENCY MEDICINE

## 2023-02-01 PROCEDURE — 80053 COMPREHEN METABOLIC PANEL: CPT

## 2023-02-01 PROCEDURE — 99284 EMERGENCY DEPT VISIT MOD MDM: CPT

## 2023-02-01 PROCEDURE — 2580000003 HC RX 258: Performed by: EMERGENCY MEDICINE

## 2023-02-01 RX ORDER — IBUPROFEN 800 MG/1
800 TABLET ORAL EVERY 6 HOURS PRN
COMMUNITY
Start: 2022-06-14

## 2023-02-01 RX ORDER — BUSPIRONE HYDROCHLORIDE 10 MG/1
10 TABLET ORAL 3 TIMES DAILY
COMMUNITY

## 2023-02-01 RX ORDER — QUETIAPINE FUMARATE 100 MG/1
100 TABLET, FILM COATED ORAL NIGHTLY
COMMUNITY

## 2023-02-01 RX ORDER — KETOROLAC TROMETHAMINE 30 MG/ML
30 INJECTION, SOLUTION INTRAMUSCULAR; INTRAVENOUS ONCE
Status: COMPLETED | OUTPATIENT
Start: 2023-02-01 | End: 2023-02-01

## 2023-02-01 RX ORDER — HYDROXYZINE PAMOATE 25 MG/1
25 CAPSULE ORAL 3 TIMES DAILY PRN
COMMUNITY

## 2023-02-01 RX ORDER — ONDANSETRON 2 MG/ML
4 INJECTION INTRAMUSCULAR; INTRAVENOUS ONCE
Status: COMPLETED | OUTPATIENT
Start: 2023-02-01 | End: 2023-02-01

## 2023-02-01 RX ORDER — NAPROXEN 500 MG/1
500 TABLET ORAL 2 TIMES DAILY WITH MEALS
Qty: 30 TABLET | Refills: 0 | Status: SHIPPED | OUTPATIENT
Start: 2023-02-01 | End: 2023-02-16

## 2023-02-01 RX ORDER — LAMOTRIGINE 100 MG/1
100 TABLET ORAL DAILY
COMMUNITY

## 2023-02-01 RX ORDER — 0.9 % SODIUM CHLORIDE 0.9 %
1000 INTRAVENOUS SOLUTION INTRAVENOUS ONCE
Status: COMPLETED | OUTPATIENT
Start: 2023-02-01 | End: 2023-02-01

## 2023-02-01 RX ADMIN — ONDANSETRON 4 MG: 2 INJECTION INTRAMUSCULAR; INTRAVENOUS at 21:46

## 2023-02-01 RX ADMIN — KETOROLAC TROMETHAMINE 30 MG: 30 INJECTION, SOLUTION INTRAMUSCULAR; INTRAVENOUS at 21:46

## 2023-02-01 RX ADMIN — SODIUM CHLORIDE 1000 ML: 9 INJECTION, SOLUTION INTRAVENOUS at 21:45

## 2023-02-01 RX ADMIN — FAMOTIDINE 20 MG: 10 INJECTION, SOLUTION INTRAVENOUS at 21:48

## 2023-02-01 ASSESSMENT — ENCOUNTER SYMPTOMS
BLOOD IN STOOL: 0
DIARRHEA: 0
ABDOMINAL PAIN: 1
VOMITING: 0
NAUSEA: 1
SHORTNESS OF BREATH: 0
SORE THROAT: 0

## 2023-02-01 ASSESSMENT — PAIN SCALES - GENERAL
PAINLEVEL_OUTOF10: 7
PAINLEVEL_OUTOF10: 9

## 2023-02-01 ASSESSMENT — PAIN DESCRIPTION - ORIENTATION
ORIENTATION: LEFT;LOWER
ORIENTATION: LEFT;LOWER

## 2023-02-01 ASSESSMENT — PAIN DESCRIPTION - LOCATION
LOCATION: ABDOMEN
LOCATION: ABDOMEN

## 2023-02-01 ASSESSMENT — PAIN - FUNCTIONAL ASSESSMENT: PAIN_FUNCTIONAL_ASSESSMENT: 0-10

## 2023-02-01 NOTE — Clinical Note
Yuri Fernandez was seen and treated in our emergency department on 2/1/2023. He may return to work on 02/04/2023. If you have any questions or concerns, please don't hesitate to call.       Vinny Barney, DO

## 2023-02-02 NOTE — DISCHARGE INSTRUCTIONS
PLEASE RETURN TO THE EMERGENCY DEPARTMENT IMMEDIATELY if your symptoms worsen in anyway or in 8-12 hours if not improved for re-evaluation. You should immediately return to the ER for symptoms such as increasing pain, bloody stool, fever, a feeling of passing out, light headed, dizziness, chest pain, shortness of breath, persistent nausea and/or vomiting, numbness or weakness to the arms or legs, coolness or color change of the arms or legs. Take your medication as indicated and prescribed. If you are given an antibiotic then, make sure you get the prescription filled and take the antibiotics until finished. Please understand that at this time there is no evidence for a more serious underlying process, but that early in the process of an illness or injury, an emergency department workup can be falsely reassuring. You should contact your family doctor within the next 24 hours for a follow up appointment    Lui Monae!!!    From [address] and [facility]    On behalf of the Emergency Department staff at [address], I would like to thank you for giving us the opportunity to address your health care needs and concerns. We hope that during your visit, our service was delivered in a professional and caring manner. Please keep [address] in mind as we walk with you down the path to your own personal wellness. Please expect an automated text message or email from us so we can ask a few questions about your health and progress. Based on your answers, a clinician may call you back to offer help and instructions. Please understand that early in the process of an illness or injury, an emergency department workup can be falsely reassuring. If you notice any worsening, changing or persistent symptoms please call your family doctor or return to the ER immediately. Tell us how we did during your visit at [email or web address]   and let us know about your experience

## 2023-02-02 NOTE — ED PROVIDER NOTES
[facility] Emergency Department  [address] [facility] [address]  Phone: [phone]  Fax: [phone] [facility]          Pt Name: Juve Marin  MRN: [id number]  [place] 1995  Date of evaluation: 2/1/2023      CHIEF COMPLAINT       Chief Complaint   Patient presents with    Abdominal Pain    Back Pain     Pt arrives with co LLQ abdominal pain and bilateral lower back pain majority on the left which presented earlier today . Pt denies any abdominal history in the past including abdominal surgeries. Pt admits to nausea denies vomiting or diarrhea. Pt states he medicated with ibuprofen 600mg around [id number] today. Pt states he would only like tylenol or motrin for pain as he is in recovery. HISTORY OF PRESENT ILLNESS       Juve Marin is a 24 y.o. male who presents with left flank pain and left lower quadrant pain. States it feels like it started in the left lower quadrant. No history of kidney stones. No trauma. Movement makes it worse. It is intermittently sharp and stabbing in nature. No urinary symptoms. No diarrhea or constipation. No history of GI issues. No vomiting but intermittent nausea. No other GI or  symptoms or any other concerns. Symptoms began earlier today. REVIEW OF SYSTEMS       Review of Systems   Constitutional:  Negative for chills and fever. HENT:  Negative for sore throat. Respiratory:  Negative for shortness of breath. Cardiovascular:  Negative for chest pain. Gastrointestinal:  Positive for abdominal pain and nausea. Negative for blood in stool, diarrhea and vomiting. Genitourinary:  Negative for difficulty urinating, dysuria, scrotal swelling and testicular pain. Musculoskeletal:  Negative for neck pain. Skin:  Negative for rash. Neurological:  Negative for weakness and numbness. PAST MEDICAL HISTORY    has a past medical history of Asthma.     SURGICAL HISTORY      has a past surgical history that includes [place] tooth extraction. CURRENT MEDICATIONS       Previous Medications    ALBUTEROL SULFATE HFA (VENTOLIN HFA) 108 (90 BASE) MCG/ACT INHALER    Inhale 2 puffs into the lungs every 6 hours as needed for Wheezing or Shortness of Breath    BUDESONIDE-FORMOTEROL (SYMBICORT) 160-4.5 MCG/ACT AERO    Inhale 2 puffs into the lungs 2 times daily    BUSPIRONE (BUSPAR) 10 MG TABLET    Take 10 mg by mouth 3 times daily    HYDROXYZINE PAMOATE (VISTARIL) 25 MG CAPSULE    Take 25 mg by mouth 3 times daily as needed    IBUPROFEN (ADVIL;MOTRIN) 800 MG TABLET    Take 800 mg by mouth every 6 hours as needed    KETOROLAC (TORADOL) 10 MG TABLET    Take 1 tablet by mouth every 6 hours as needed for Pain    LAMOTRIGINE (LAMICTAL) 100 MG TABLET    Take 100 mg by mouth daily    QUETIAPINE (SEROQUEL) 100 MG TABLET    Take 100 mg by mouth nightly    SPACER/AERO-HOLDING CHAMBERS JORGE    1 Device by Does not apply route daily as needed (with inhaler)    VARENICLINE (CHANTIX STARTING MONTH ) 0.5 MG X 11 & 1 MG X 42 TABLET    Take by mouth. ALLERGIES     is allergic to pcn [penicillins]. FAMILY HISTORY     He indicated that his mother is . He indicated that his father is . family history includes Diabetes in his father and mother; Heart Disease in his father and mother; Kidney Disease in his mother. SOCIAL HISTORY      reports that he has been smoking e-cigarettes and cigarettes. He started smoking about 18 years ago. He has a 12.75 pack-year smoking history. He has never used smokeless tobacco. He reports that he does not currently use alcohol. He reports that he does not currently use drugs after having used the following drugs: Marijuana (Weed) and [employer] comments. Frequency: 7.00 times per week. PHYSICAL EXAM     INITIAL VITALS:  height is 5' 9\" (1.753 m) and weight is 110.2 kg (243 lb). His oral temperature is 97.7 °F (36.5 °C).  His blood pressure is 134/77 and his pulse is 77. His respiration is 16 and oxygen saturation is 100%. Physical Exam  Constitutional:       General: He is not in acute distress. Appearance: He is well-developed. HENT:      Head: Normocephalic and atraumatic. Right Ear: External ear normal.      Left Ear: External ear normal.   Eyes:      General: Lids are normal.         Right eye: No discharge. Left eye: No discharge. Neck:      Trachea: No tracheal deviation. Cardiovascular:      Rate and Rhythm: Normal rate and regular rhythm. Pulmonary:      Effort: Pulmonary effort is normal.   Abdominal:      Palpations: Abdomen is soft. Comments: Patient does have left flank CVA tenderness in addition to left lower quadrant tenderness. No peritoneal signs. Otherwise unremarkable abdominal exam.   Skin:     General: Skin is warm and dry. Neurological:      Mental Status: He is alert. GCS: GCS eye subscore is 4. GCS verbal subscore is 5. GCS motor subscore is 6. Psychiatric:         Behavior: Behavior normal.         DIFFERENTIAL DIAGNOSIS/ MDM:     Plan will be to initiate a further GI work-up. Clinically he appears well and otherwise nontoxic or septic. Differential diagnosis considered: Diverticulitis. Ureteral stone.   Other intra-abdominal pathology    Chronic Conditions affecting care (DM,HTN,CA, etc): None    Social Determinants of Health affecting care (unable to care for self, lives alone, unemployed, homeless,etc): None    History source(s) (patient,spouse,parent,family,friend,EMS,etc): Patient    Review of external sources (ECF,Hospital records,EMS report, radiology reports, etc): None available    Tests considered but not ordered: Not applicable    Independent interpretation of tests (eg.  X-ray, CAT scan, Doppler studies, EKG): Not applicable    Discussion of x-ray results with radiology: Not applicable    Consults: See below    Consideration for admission/observation (even if discharged): Consider    Prescription considerations: Not applicable    Sepsis considered: Considered but unlikely    Critical Care note written: See below      DIAGNOSTIC RESULTS     EKG: All EKG's are interpreted by the Emergency Department Physician who either signs or Co-signs this chart in the absence of a cardiologist.        RADIOLOGY:   Interpretation per the Radiologist below, if available at the time of this note:  CT ABDOMEN PELVIS WO CONTRAST Additional Contrast? None   Final Result   No obstructive uropathy. No renal stones. Diverticulosis with no evidence of diverticulitis. Appendix is normal.             No results found. LABS:  Results for orders placed or performed during the hospital encounter of 02/01/23   Urinalysis with Reflex to Culture    Specimen: Urine, clean catch   Result Value Ref Range    Color, UA Yellow Yellow    Turbidity UA Clear Clear    Glucose, Ur NEGATIVE NEGATIVE    Bilirubin Urine NEGATIVE NEGATIVE    Ketones, Urine NEGATIVE NEGATIVE    Specific Gravity, UA 1.020 1.005 - 1.030    Urine Hgb NEGATIVE NEGATIVE    pH, UA 6.5 5.0 - 8.0    Protein, UA NEGATIVE NEGATIVE    Urobilinogen, Urine Normal Normal    Nitrite, Urine NEGATIVE NEGATIVE    Leukocyte Esterase, Urine NEGATIVE NEGATIVE    Urinalysis Comments       Microscopic exam not performed based on chemical results unless requested in original order. Urinalysis Comments          Urinalysis Comments       Utilizing a urinalysis as the only screening method to exclude a potential uropathogen can be unreliable in many patient populations. Rapid screening tests are less sensitive than culture and if UTI is a clinical possibility, culture should be considered despite a negative urinalysis.      Comprehensive Metabolic Panel   Result Value Ref Range    Glucose 103 (H) 70 - 99 mg/dL    BUN 17 6 - 20 mg/dL    Creatinine 0.82 0.70 - 1.20 mg/dL    Est, Glom Filt Rate >60 >60 mL/min/1.73m2    Calcium 9.5 8.6 - 10.4 mg/dL    Sodium 137 135 - 144 mmol/L    Potassium 3.5 (L) 3.7 - 5.3 mmol/L    Chloride 99 98 - 107 mmol/L    CO2 23 20 - 31 mmol/L    Anion Gap 15 9 - 17 mmol/L    Alkaline Phosphatase 65 40 - 129 U/L    ALT 43 (H) 5 - 41 U/L    AST 24 <40 U/L    Total Bilirubin 0.5 0.3 - 1.2 mg/dL    Total Protein 7.7 6.4 - 8.3 g/dL    Albumin 4.8 3.5 - 5.2 g/dL    Albumin/Globulin Ratio 1.7 1.0 - 2.5   CBC with Auto Differential   Result Value Ref Range    WBC 9.4 3.5 - 11.0 k/uL    RBC 5.16 4.5 - 5.9 m/uL    Hemoglobin 16.1 13.5 - 17.5 g/dL    Hematocrit 46.4 41 - 53 %    MCV 89.8 80 - 100 fL    MCH 31.2 26 - 34 pg    MCHC 34.8 31 - 37 g/dL    RDW 13.3 12.5 - 15.4 %    Platelets 607 321 - 619 k/uL    MPV 7.9 6.0 - 12.0 fL    Seg Neutrophils 66 36 - 66 %    Lymphocytes 25 24 - 44 %    Monocytes 7 2 - 11 %    Eosinophils % 1 1 - 4 %    Basophils 1 0 - 2 %    Segs Absolute 6.20 1.8 - 7.7 k/uL    Absolute Lymph # 2.30 1.0 - 4.8 k/uL    Absolute Mono # 0.60 0.1 - 1.2 k/uL    Absolute Eos # 0.10 0.0 - 0.4 k/uL    Basophils Absolute 0.10 0.0 - 0.2 k/uL   Lipase   Result Value Ref Range    Lipase 33 13 - 60 U/L   Magnesium   Result Value Ref Range    Magnesium 2.1 1.6 - 2.6 mg/dL       EMERGENCY DEPARTMENT COURSE:     The patient was given the following medications:  Orders Placed This Encounter   Medications    0.9 % sodium chloride bolus    ketorolac (TORADOL) injection 30 mg    ondansetron (ZOFRAN) injection 4 mg    famotidine (PEPCID) 20 mg in sodium chloride (PF) 0.9 % 10 mL injection    naproxen (NAPROSYN) 500 MG tablet     Sig: Take 1 tablet by mouth 2 times daily (with meals) for 30 doses     Dispense:  30 tablet     Refill:  0        Vitals:    Vitals:    02/01/23 2125 02/01/23 2254   BP: 135/68 134/77   Pulse: 95 77   Resp: 16 16   Temp: 97.7 °F (36.5 °C)    TempSrc: Oral Oral   SpO2: 98% 100%   Weight: 110.2 kg (243 lb)    Height: 5' 9\" (1.753 m)      -------------------------  BP: 134/77, Temp: 97.7 °F (36.5 °C), Heart Rate: 77, Resp: 16      Re-evaluation Notes    Patient is doing better on reevaluation. No acute changes. CT shows no acute significant findings. May be more musculoskeletal in nature based on presentation. He was advised to follow-up with his PCP or return right away if worsening or for new or concerning symptoms. Will prescribe short course of Naprosyn to see if that helps. He is declining opiate analgesics as he is currently in recovery from opiate addiction. He knows return right away if worse or for new or concerning symptoms. I estimate there is LOW risk for ACUTE APPENDICITIS, BOWEL OBSTRUCTION, CHOLECYSTITIS, DIVERTICULITIS, INCARCERATED HERNIA, PANCREATITIS, or PERFORATED BOWEL or ULCER, thus I consider the discharge disposition reasonable. Re-evaluation of the abdomen is benign. No guarding, peritoneal signs or significant tenderness noted. Also, there is no evidence or peritonitis, sepsis, or toxicity. The patient and/or family and I have discussed the diagnosis and risks, and we agree with discharging home to follow-up with their primary doctor. The patient presents with abdominal pain without signs of peritonitis or other life-threatening or serious etiology. The patient appears stable for discharge and has been instructed to return immediately if the symptoms worsen in any way, or in 8-12 hr if not improved for re-evaluation. We also discussed returning to the Emergency Department immediately if new or worsening symptoms occur. We have discussed the symptoms which are most concerning (e.g., bloody stool, fever, changing or worsening pain, persistent vomiting, chest pain shortness of breath, numbness or weakness to the arms or legs, coolness or color change to the arms or legs) that necessitate immediate return.      The patient understands that at this time there is no evidence for a more malignant underlying process, but the patient also understands that early in the process of an illness or injury, an emergency department workup can be falsely reassuring. Routine discharge counseling was given, and the patient understands that worsening, changing or persistent symptoms should prompt an immediate call or follow up with their primary physician or return to the emergency department. The importance of appropriate follow up was also discussed. I have reviewed the disposition diagnosis with the patient and or their family/guardian. I have answered their questions and given discharge instructions. They voiced understanding of these instructions and did not have any further questions or complaints. CONSULTS:    None    CRITICAL CARE:     None    PROCEDURES:    None    FINAL IMPRESSION      1. Left lower quadrant abdominal pain          DISPOSITION/PLAN   DISPOSITION Decision To Discharge 02/01/2023 10:58:25 PM      Condition on Disposition    Improved    PATIENT REFERRED TO:  No follow-up provider specified.     DISCHARGE MEDICATIONS:  New Prescriptions    NAPROXEN (NAPROSYN) 500 MG TABLET    Take 1 tablet by mouth 2 times daily (with meals) for 30 doses       (Please note that portions of this note were completed with a voice recognition program.  Efforts were made to edit the dictations but occasionally words are mis-transcribed.)    Lissett River DO, DO  Attending Emergency Physician        Lissett River DO  02/01/23 [id number]

## 2023-08-05 DIAGNOSIS — J45.20 MILD INTERMITTENT ASTHMA WITHOUT COMPLICATION: ICD-10-CM

## 2023-08-08 RX ORDER — ALBUTEROL SULFATE 90 UG/1
2 AEROSOL, METERED RESPIRATORY (INHALATION) EVERY 6 HOURS PRN
Qty: 1 EACH | Refills: 1 | OUTPATIENT
Start: 2023-08-08 | End: 2024-08-07

## 2024-01-07 ENCOUNTER — HOSPITAL ENCOUNTER (EMERGENCY)
Age: 29
Discharge: HOME | End: 2024-01-07
Attending: EMERGENCY MEDICINE
Payer: MEDICAID

## 2024-01-07 ENCOUNTER — APPOINTMENT (OUTPATIENT)
Dept: GENERAL RADIOLOGY | Age: 29
End: 2024-01-07
Payer: MEDICAID

## 2024-01-07 VITALS
WEIGHT: 200 LBS | SYSTOLIC BLOOD PRESSURE: 127 MMHG | DIASTOLIC BLOOD PRESSURE: 72 MMHG | OXYGEN SATURATION: 99 % | BODY MASS INDEX: 29.53 KG/M2 | HEART RATE: 87 BPM | RESPIRATION RATE: 16 BRPM | TEMPERATURE: 98 F

## 2024-01-07 DIAGNOSIS — S39.012A STRAIN OF LUMBAR REGION, INITIAL ENCOUNTER: ICD-10-CM

## 2024-01-07 DIAGNOSIS — M54.32 SCIATICA OF LEFT SIDE: Primary | ICD-10-CM

## 2024-01-07 PROCEDURE — 6360000002 HC RX W HCPCS: Performed by: NURSE PRACTITIONER

## 2024-01-07 PROCEDURE — 72100 X-RAY EXAM L-S SPINE 2/3 VWS: CPT

## 2024-01-07 PROCEDURE — 96372 THER/PROPH/DIAG INJ SC/IM: CPT

## 2024-01-07 PROCEDURE — 99284 EMERGENCY DEPT VISIT MOD MDM: CPT

## 2024-01-07 PROCEDURE — 6370000000 HC RX 637 (ALT 250 FOR IP): Performed by: NURSE PRACTITIONER

## 2024-01-07 RX ORDER — PREDNISONE 50 MG/1
50 TABLET ORAL DAILY
Qty: 5 TABLET | Refills: 0 | Status: SHIPPED | OUTPATIENT
Start: 2024-01-07 | End: 2024-01-12

## 2024-01-07 RX ORDER — PREDNISONE 20 MG/1
50 TABLET ORAL ONCE
Status: COMPLETED | OUTPATIENT
Start: 2024-01-07 | End: 2024-01-07

## 2024-01-07 RX ORDER — CYCLOBENZAPRINE HCL 10 MG
10 TABLET ORAL 3 TIMES DAILY PRN
Qty: 15 TABLET | Refills: 0 | Status: SHIPPED | OUTPATIENT
Start: 2024-01-07 | End: 2024-01-14

## 2024-01-07 RX ORDER — KETOROLAC TROMETHAMINE 30 MG/ML
30 INJECTION, SOLUTION INTRAMUSCULAR; INTRAVENOUS ONCE
Status: COMPLETED | OUTPATIENT
Start: 2024-01-07 | End: 2024-01-07

## 2024-01-07 RX ORDER — IBUPROFEN 600 MG/1
600 TABLET ORAL EVERY 6 HOURS PRN
Qty: 20 TABLET | Refills: 0 | Status: SHIPPED | OUTPATIENT
Start: 2024-01-07

## 2024-01-07 RX ADMIN — KETOROLAC TROMETHAMINE 30 MG: 30 INJECTION, SOLUTION INTRAMUSCULAR; INTRAVENOUS at 16:56

## 2024-01-07 RX ADMIN — PREDNISONE 50 MG: 20 TABLET ORAL at 16:56

## 2024-01-07 ASSESSMENT — ENCOUNTER SYMPTOMS
BACK PAIN: 1
VOMITING: 0
COLOR CHANGE: 0
ABDOMINAL PAIN: 0
CONSTIPATION: 0
DIARRHEA: 0
NAUSEA: 0

## 2024-01-07 ASSESSMENT — PAIN SCALES - GENERAL
PAINLEVEL_OUTOF10: 9
PAINLEVEL_OUTOF10: 7

## 2024-01-07 ASSESSMENT — PAIN - FUNCTIONAL ASSESSMENT: PAIN_FUNCTIONAL_ASSESSMENT: 0-10

## 2024-01-07 NOTE — ED PROVIDER NOTES
[facility] ED  eMERGENCY dEPARTMENT eNCOUnter      Pt Name: Rustam Neal  MRN: [id number]  Birthdate 1995  Date of evaluation: 1/7/2024  Provider: SHAYLA Parra CNP    CHIEF COMPLAINT       Chief Complaint   Patient presents with    Hip Pain     Left side    Back Pain         HISTORY OF PRESENT ILLNESS  (Location/Symptom, Timing/Onset, Context/Setting, Quality, Duration, Modifying Factors, Severity.)   Rustam Neal is a 28 y.o. male who presents to the emergency department for evaluation of lower back pain that radiates down his left buttock and left leg for the past several days.  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  Denies specific injury.  No loss of bowel or bladder control.  Pain out of 10 aggravated with certain movement.  No abdominal pain dysuria hematuria.  No numbness or tingling in his legs.      Nursing Notes were reviewed.    ALLERGIES     Pcn [penicillins]    CURRENT MEDICATIONS       Previous Medications    ALBUTEROL SULFATE HFA (VENTOLIN HFA) 108 (90 BASE) MCG/ACT INHALER    Inhale 2 puffs into the lungs every 6 hours as needed for Wheezing or Shortness of Breath    BUDESONIDE-FORMOTEROL (SYMBICORT) 160-4.5 MCG/ACT AERO    Inhale 2 puffs into the lungs 2 times daily    BUSPIRONE (BUSPAR) 10 MG TABLET    Take 10 mg by mouth 3 times daily    HYDROXYZINE PAMOATE (VISTARIL) 25 MG CAPSULE    Take 25 mg by mouth 3 times daily as needed    KETOROLAC (TORADOL) 10 MG TABLET    Take 1 tablet by mouth every 6 hours as needed for Pain    LAMOTRIGINE (LAMICTAL) 100 MG TABLET    Take 100 mg by mouth daily    NAPROXEN (NAPROSYN) 500 MG TABLET    Take 1 tablet by mouth 2 times daily (with meals) for 30 doses    QUETIAPINE (SEROQUEL) 100 MG TABLET    Take 100 mg by mouth nightly    SPACER/AERO-HOLDING CHAMBERS JORGE    1 Device by Does not apply route daily as needed (with inhaler)    VARENICLINE (CHANTIX STARTING

## 2024-01-07 NOTE — ED NOTES
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

## 2024-01-07 NOTE — DISCHARGE INSTRUCTIONS
Take medications as prescribed.  Do not drive, operate machinery, or consume alcohol taking Flexeril as it can cause drowsiness.  Follow-up with your primary care provider or primary care provider on list.  Return to emergency department for worsening or new symptoms.

## 2024-03-19 ENCOUNTER — HOSPITAL ENCOUNTER (EMERGENCY)
Age: 29
Discharge: HOME | End: 2024-03-19
Attending: EMERGENCY MEDICINE
Payer: MEDICAID

## 2024-03-19 ENCOUNTER — APPOINTMENT (OUTPATIENT)
Dept: CT IMAGING | Age: 29
End: 2024-03-19
Payer: MEDICAID

## 2024-03-19 VITALS
DIASTOLIC BLOOD PRESSURE: 87 MMHG | RESPIRATION RATE: 18 BRPM | HEIGHT: 69 IN | OXYGEN SATURATION: 99 % | HEART RATE: 68 BPM | WEIGHT: 209 LBS | BODY MASS INDEX: 30.96 KG/M2 | SYSTOLIC BLOOD PRESSURE: 120 MMHG | TEMPERATURE: 97.7 F

## 2024-03-19 DIAGNOSIS — R51.9 NONINTRACTABLE HEADACHE, UNSPECIFIED CHRONICITY PATTERN, UNSPECIFIED HEADACHE TYPE: Primary | ICD-10-CM

## 2024-03-19 PROCEDURE — 70450 CT HEAD/BRAIN W/O DYE: CPT

## 2024-03-19 PROCEDURE — 99284 EMERGENCY DEPT VISIT MOD MDM: CPT

## 2024-03-19 PROCEDURE — 6370000000 HC RX 637 (ALT 250 FOR IP): Performed by: NURSE PRACTITIONER

## 2024-03-19 PROCEDURE — 96372 THER/PROPH/DIAG INJ SC/IM: CPT

## 2024-03-19 PROCEDURE — 6360000002 HC RX W HCPCS: Performed by: NURSE PRACTITIONER

## 2024-03-19 RX ORDER — KETOROLAC TROMETHAMINE 30 MG/ML
30 INJECTION, SOLUTION INTRAMUSCULAR; INTRAVENOUS ONCE
Status: COMPLETED | OUTPATIENT
Start: 2024-03-19 | End: 2024-03-19

## 2024-03-19 RX ORDER — DEXAMETHASONE 4 MG/1
8 TABLET ORAL ONCE
Status: COMPLETED | OUTPATIENT
Start: 2024-03-19 | End: 2024-03-19

## 2024-03-19 RX ORDER — BUTALBITAL, ACETAMINOPHEN AND CAFFEINE 50; 325; 40 MG/1; MG/1; MG/1
1 TABLET ORAL EVERY 6 HOURS PRN
Qty: 12 TABLET | Refills: 0 | Status: SHIPPED | OUTPATIENT
Start: 2024-03-19

## 2024-03-19 RX ORDER — BUTALBITAL, ACETAMINOPHEN AND CAFFEINE 50; 325; 40 MG/1; MG/1; MG/1
1 TABLET ORAL ONCE
Status: COMPLETED | OUTPATIENT
Start: 2024-03-19 | End: 2024-03-19

## 2024-03-19 RX ADMIN — KETOROLAC TROMETHAMINE 30 MG: 30 INJECTION, SOLUTION INTRAMUSCULAR at 17:37

## 2024-03-19 RX ADMIN — DEXAMETHASONE 8 MG: 4 TABLET ORAL at 17:36

## 2024-03-19 RX ADMIN — BUTALBITAL, ACETAMINOPHEN, AND CAFFEINE 1 TABLET: 50; 325; 40 TABLET ORAL at 17:36

## 2024-03-19 ASSESSMENT — PAIN - FUNCTIONAL ASSESSMENT: PAIN_FUNCTIONAL_ASSESSMENT: 0-10

## 2024-03-19 ASSESSMENT — ENCOUNTER SYMPTOMS
COLOR CHANGE: 0
ABDOMINAL PAIN: 0
SHORTNESS OF BREATH: 0
EYE PAIN: 0
VOMITING: 0
NAUSEA: 0
DIARRHEA: 0
RHINORRHEA: 0

## 2024-03-19 ASSESSMENT — PAIN DESCRIPTION - LOCATION: LOCATION: HEAD

## 2024-03-19 ASSESSMENT — PAIN DESCRIPTION - PAIN TYPE: TYPE: ACUTE PAIN

## 2024-03-19 ASSESSMENT — PAIN DESCRIPTION - FREQUENCY: FREQUENCY: CONTINUOUS

## 2024-03-19 ASSESSMENT — PAIN SCALES - GENERAL
PAINLEVEL_OUTOF10: 9
PAINLEVEL_OUTOF10: 7
PAINLEVEL_OUTOF10: 9

## 2024-03-19 ASSESSMENT — PAIN DESCRIPTION - DESCRIPTORS
DESCRIPTORS: ACHING;THROBBING
DESCRIPTORS: THROBBING

## 2024-03-19 NOTE — DISCHARGE INSTR - COC
output data in the 24 hours ending 24 1715  No intake/output data recorded.    Safety Concerns:     { RIGOBERTO Safety Concerns:854473675}    Impairments/Disabilities:      { RIGOBERTO Impairments/Disabilities:644953819}    Nutrition Therapy:  Current Nutrition Therapy:   { RIGOBERTO Diet List:124479435}    Routes of Feeding: {CHP DME Other Feedings:057542698}  Liquids: {Slp liquid thickness:49274}  Daily Fluid Restriction: {CHP DME Yes amt example:886098022}  Last Modified Barium Swallow with Video (Video Swallowing Test): {Done Not Done Date:}    Treatments at the Time of Hospital Discharge:   Respiratory Treatments: ***  Oxygen Therapy:  {Therapy; copd oxygen:03199}  Ventilator:    { CC Vent List:273657291}    Rehab Therapies: {THERAPEUTIC INTERVENTION:9549706932}  Weight Bearing Status/Restrictions: { CC Weight Bearin}  Other Medical Equipment (for information only, NOT a DME order):  {EQUIPMENT:574021314}  Other Treatments: ***    Patient's personal belongings (please select all that are sent with patient):  {[facility] DME Belongings:617186579}    RN SIGNATURE:  {Esignature:919371853}    CASE MANAGEMENT/SOCIAL WORK SECTION    Inpatient Status Date: ***    Readmission Risk Assessment Score:  Readmission Risk              Risk of Unplanned Readmission:  0           Discharging to Facility/ Agency   Name:   Address:  Phone:  Fax:    Dialysis Facility (if applicable)   Name:  Address:  Dialysis Schedule:  Phone:  Fax:    / signature: {Esignature:346554161}    PHYSICIAN SECTION    Prognosis: {Prognosis:3675464998}    Condition at Discharge: { Patient Condition:029545369}    Rehab Potential (if transferring to Rehab): {Prognosis:5392883489}    Recommended Labs or Other Treatments After Discharge: ***    Physician Certification: I certify the above information and transfer of Rustam Neal  is necessary for the continuing treatment of the diagnosis listed and that he requires

## 2024-03-19 NOTE — ED PROVIDER NOTES
[facility] ED  eMERGENCY dEPARTMENT eNCOUnter      Pt Name: Rustam Neal  MRN: [id number]  Birthdate 1995  Date of evaluation: 3/19/2024  Provider: SHAYLA Akbar CNP    CHIEF COMPLAINT       Chief Complaint   Patient presents with    Headache     X3-4 days. Light and sound sensitivity. Pain in front of head and behind eyes. Pain feels like throbbing. Taken ibuprofen, tylenol, and aspirin with little to no relief.         HISTORY OF PRESENT ILLNESS  (Location/Symptom, Timing/Onset, Context/Setting, Quality, Duration, Modifying Factors, Severity.)   Rustam Neal is a 28 y.o. male who presents to the emergency department. C/o headache. Onset was 3-4 days ago. Denies fever, chills, vision changes, dizziness, injury. Denies congestion, sore throat, CP, SOB. Denies N/V/D. Rates his pain 7/10. He has tried ibuprofen without relief. He is accompanied by family.      Nursing Notes were reviewed.    ALLERGIES     Pcn [penicillins]    CURRENT MEDICATIONS       Discharge Medication List as of 3/19/2024  6:51 PM        CONTINUE these medications which have NOT CHANGED    Details   ibuprofen (IBU) 600 MG tablet Take 1 tablet by mouth every 6 hours as needed for Pain, Disp-20 tablet, R-0Print      QUEtiapine (SEROQUEL) 100 MG tablet Take 100 mg by mouth nightlyHistorical Med      lamoTRIgine (LAMICTAL) 100 MG tablet Take 100 mg by mouth dailyHistorical Med      hydrOXYzine pamoate (VISTARIL) 25 MG capsule Take 25 mg by mouth 3 times daily as neededHistorical Med      busPIRone (BUSPAR) 10 MG tablet Take 10 mg by mouth 3 times dailyHistorical Med      naproxen (NAPROSYN) 500 MG tablet Take 1 tablet by mouth 2 times daily (with meals) for 30 doses, Disp-30 tablet, R-0Normal      albuterol sulfate HFA (VENTOLIN HFA) 108 (90 Base) MCG/ACT inhaler Inhale 2 puffs into the lungs every 6 hours as needed for Wheezing or Shortness of Breath, Disp-1 Inhaler, R-1Normal      Spacer/Aero-Holding Chambers

## 2024-06-21 ENCOUNTER — HOSPITAL ENCOUNTER (EMERGENCY)
Age: 29
Discharge: HOME | End: 2024-06-21
Attending: EMERGENCY MEDICINE
Payer: MEDICAID

## 2024-06-21 ENCOUNTER — APPOINTMENT (OUTPATIENT)
Dept: CT IMAGING | Age: 29
End: 2024-06-21
Payer: MEDICAID

## 2024-06-21 VITALS
BODY MASS INDEX: 27.99 KG/M2 | OXYGEN SATURATION: 99 % | RESPIRATION RATE: 18 BRPM | HEART RATE: 79 BPM | DIASTOLIC BLOOD PRESSURE: 69 MMHG | TEMPERATURE: 97 F | WEIGHT: 189 LBS | HEIGHT: 69 IN | SYSTOLIC BLOOD PRESSURE: 128 MMHG

## 2024-06-21 DIAGNOSIS — R10.13 ABDOMINAL PAIN, EPIGASTRIC: Primary | ICD-10-CM

## 2024-06-21 LAB
ALBUMIN SERPL-MCNC: 4.4 G/DL (ref 3.5–5.2)
ALP SERPL-CCNC: 53 U/L (ref 40–129)
ALT SERPL-CCNC: 20 U/L (ref 5–41)
ANION GAP SERPL CALCULATED.3IONS-SCNC: 12 MMOL/L (ref 9–17)
AST SERPL-CCNC: 17 U/L
BASOPHILS # BLD: 0.03 K/UL (ref 0–0.2)
BASOPHILS NFR BLD: 1 % (ref 0–2)
BILIRUB SERPL-MCNC: 1.3 MG/DL (ref 0.3–1.2)
BILIRUB UR QL STRIP: NEGATIVE
BUN SERPL-MCNC: 19 MG/DL (ref 6–20)
BUN/CREAT SERPL: 24 (ref 9–20)
CALCIUM SERPL-MCNC: 9.4 MG/DL (ref 8.6–10.4)
CHLORIDE SERPL-SCNC: 103 MMOL/L (ref 98–107)
CLARITY UR: CLEAR
CO2 SERPL-SCNC: 23 MMOL/L (ref 20–31)
COLOR UR: YELLOW
CREAT SERPL-MCNC: 0.8 MG/DL (ref 0.7–1.2)
EOSINOPHIL # BLD: 0.16 K/UL (ref 0–0.44)
EOSINOPHILS RELATIVE PERCENT: 3 % (ref 1–4)
EPI CELLS #/AREA URNS HPF: NORMAL /HPF (ref 0–5)
ERYTHROCYTE [DISTWIDTH] IN BLOOD BY AUTOMATED COUNT: 13.3 % (ref 11.8–14.4)
GFR, ESTIMATED: >90 ML/MIN/1.73M2
GLUCOSE SERPL-MCNC: 92 MG/DL (ref 70–99)
GLUCOSE UR STRIP-MCNC: NEGATIVE MG/DL
HCT VFR BLD AUTO: 42.4 % (ref 40.7–50.3)
HGB BLD-MCNC: 14.3 G/DL (ref 13–17)
HGB UR QL STRIP.AUTO: NEGATIVE
IMM GRANULOCYTES # BLD AUTO: 0 K/UL (ref 0–0.3)
IMM GRANULOCYTES NFR BLD: 0 %
KETONES UR STRIP-MCNC: NEGATIVE MG/DL
LEUKOCYTE ESTERASE UR QL STRIP: NEGATIVE
LIPASE SERPL-CCNC: 26 U/L (ref 13–60)
LYMPHOCYTES NFR BLD: 1.75 K/UL (ref 1.1–3.7)
LYMPHOCYTES RELATIVE PERCENT: 30 % (ref 24–43)
MCH RBC QN AUTO: 30.5 PG (ref 25.2–33.5)
MCHC RBC AUTO-ENTMCNC: 33.7 G/DL (ref 28.4–34.8)
MCV RBC AUTO: 90.4 FL (ref 82.6–102.9)
MONOCYTES NFR BLD: 0.5 K/UL (ref 0.1–1.2)
MONOCYTES NFR BLD: 9 % (ref 3–12)
NEUTROPHILS NFR BLD: 57 % (ref 36–65)
NEUTS SEG NFR BLD: 3.32 K/UL (ref 1.5–8.1)
NITRITE UR QL STRIP: NEGATIVE
NRBC BLD-RTO: 0 PER 100 WBC
PH UR STRIP: 7.5 [PH] (ref 5–8)
PLATELET # BLD AUTO: 226 K/UL (ref 138–453)
PMV BLD AUTO: 9.7 FL (ref 8.1–13.5)
POTASSIUM SERPL-SCNC: 4.2 MMOL/L (ref 3.7–5.3)
PROT SERPL-MCNC: 7.4 G/DL (ref 6.4–8.3)
PROT UR STRIP-MCNC: NEGATIVE MG/DL
RBC # BLD AUTO: 4.69 M/UL (ref 4.21–5.77)
RBC #/AREA URNS HPF: NORMAL /HPF (ref 0–2)
SODIUM SERPL-SCNC: 138 MMOL/L (ref 135–144)
SP GR UR STRIP: 1.01 (ref 1–1.03)
UROBILINOGEN UR STRIP-ACNC: NORMAL EU/DL (ref 0–1)
WBC #/AREA URNS HPF: NORMAL /HPF (ref 0–5)
WBC OTHER # BLD: 5.8 K/UL (ref 3.5–11.3)

## 2024-06-21 PROCEDURE — 99285 EMERGENCY DEPT VISIT HI MDM: CPT

## 2024-06-21 PROCEDURE — 80053 COMPREHEN METABOLIC PANEL: CPT

## 2024-06-21 PROCEDURE — 74177 CT ABD & PELVIS W/CONTRAST: CPT

## 2024-06-21 PROCEDURE — 83690 ASSAY OF LIPASE: CPT

## 2024-06-21 PROCEDURE — 85025 COMPLETE CBC W/AUTO DIFF WBC: CPT

## 2024-06-21 PROCEDURE — 2580000003 HC RX 258: Performed by: EMERGENCY MEDICINE

## 2024-06-21 PROCEDURE — 81001 URINALYSIS AUTO W/SCOPE: CPT

## 2024-06-21 PROCEDURE — 6360000004 HC RX CONTRAST MEDICATION: Performed by: EMERGENCY MEDICINE

## 2024-06-21 RX ORDER — SODIUM CHLORIDE 0.9 % (FLUSH) 0.9 %
10 SYRINGE (ML) INJECTION ONCE
Status: COMPLETED | OUTPATIENT
Start: 2024-06-21 | End: 2024-06-21

## 2024-06-21 RX ORDER — 0.9 % SODIUM CHLORIDE 0.9 %
80 INTRAVENOUS SOLUTION INTRAVENOUS ONCE
Status: COMPLETED | OUTPATIENT
Start: 2024-06-21 | End: 2024-06-21

## 2024-06-21 RX ADMIN — IOPAMIDOL 75 ML: 755 INJECTION, SOLUTION INTRAVENOUS at 15:48

## 2024-06-21 RX ADMIN — SODIUM CHLORIDE, PRESERVATIVE FREE 10 ML: 5 INJECTION INTRAVENOUS at 15:48

## 2024-06-21 RX ADMIN — SODIUM CHLORIDE 80 ML: 9 INJECTION, SOLUTION INTRAVENOUS at 15:48

## 2024-06-21 ASSESSMENT — ENCOUNTER SYMPTOMS
BACK PAIN: 0
EYE DISCHARGE: 0
FACIAL SWELLING: 0
ABDOMINAL DISTENTION: 0
ABDOMINAL PAIN: 1
SHORTNESS OF BREATH: 0
EYE PAIN: 0
CHEST TIGHTNESS: 0

## 2024-06-21 ASSESSMENT — PAIN SCALES - GENERAL: PAINLEVEL_OUTOF10: 7

## 2024-06-21 ASSESSMENT — PAIN - FUNCTIONAL ASSESSMENT: PAIN_FUNCTIONAL_ASSESSMENT: 0-10

## 2024-06-21 NOTE — ED PROVIDER NOTES
EMERGENCY DEPARTMENT ENCOUNTER    Pt Name: Rustam Neal  MRN: [id number]  Birthdate 1995  Date of evaluation: 6/21/24  CHIEF COMPLAINT       Chief Complaint   Patient presents with    Abdominal Pain     Pt states abdominal pain x 4 months. Pt states being dx with constipation. Pt states taking stool softer with no relief     Nausea     HISTORY OF PRESENT ILLNESS   HPI   The patient is a 28-year-old male who presented to the emergency department secondary to abdominal pain.  Patient complains of abdominal pain that began 4 months ago and then went away.  Patient was evaluated by his PCP told he was constipated given stool softeners which she is taking but still does not have complete bowel movements.  He has never had a colonoscopy.  No previous history of IBS Crohn's or other GI disorder.  No recent antibiotic use or travel outside the country.  Denied rectal bleeding.  Patient no chest pain, shortness of breath, nausea, vomiting, fevers or chills      REVIEW OF SYSTEMS     Review of Systems   Constitutional:  Negative for chills, diaphoresis and fever.   HENT:  Negative for congestion, ear pain and facial swelling.    Eyes:  Negative for pain, discharge and visual disturbance.   Respiratory:  Negative for chest tightness and shortness of breath.    Cardiovascular:  Negative for chest pain and palpitations.   Gastrointestinal:  Positive for abdominal pain. Negative for abdominal distention.   Genitourinary:  Negative for difficulty urinating and flank pain.   Musculoskeletal:  Negative for back pain.   Skin:  Negative for wound.   Neurological:  Negative for dizziness, light-headedness and headaches.     PASTMEDICAL HISTORY     Past Medical History:   Diagnosis Date    Asthma      Past Problem List  There is no problem list on file for this patient.    SURGICAL HISTORY       Past Surgical History:   Procedure Laterality Date    WISDOM TOOTH EXTRACTION       CURRENT MEDICATIONS       Previous Medications

## 2025-01-03 ENCOUNTER — HOSPITAL ENCOUNTER (EMERGENCY)
Age: 30
Discharge: HOME | End: 2025-01-03
Payer: COMMERCIAL

## 2025-01-03 ENCOUNTER — APPOINTMENT (OUTPATIENT)
Dept: GENERAL RADIOLOGY | Age: 30
End: 2025-01-03
Payer: COMMERCIAL

## 2025-01-03 VITALS
BODY MASS INDEX: 26.58 KG/M2 | DIASTOLIC BLOOD PRESSURE: 68 MMHG | HEART RATE: 84 BPM | WEIGHT: 180 LBS | OXYGEN SATURATION: 100 % | TEMPERATURE: 97.9 F | RESPIRATION RATE: 14 BRPM | SYSTOLIC BLOOD PRESSURE: 136 MMHG

## 2025-01-03 DIAGNOSIS — M25.552 LEFT HIP PAIN: Primary | ICD-10-CM

## 2025-01-03 PROCEDURE — 96372 THER/PROPH/DIAG INJ SC/IM: CPT

## 2025-01-03 PROCEDURE — 73502 X-RAY EXAM HIP UNI 2-3 VIEWS: CPT

## 2025-01-03 PROCEDURE — 99284 EMERGENCY DEPT VISIT MOD MDM: CPT

## 2025-01-03 PROCEDURE — 6360000002 HC RX W HCPCS

## 2025-01-03 RX ORDER — KETOROLAC TROMETHAMINE 30 MG/ML
30 INJECTION, SOLUTION INTRAMUSCULAR; INTRAVENOUS ONCE
Status: COMPLETED | OUTPATIENT
Start: 2025-01-03 | End: 2025-01-03

## 2025-01-03 RX ORDER — IBUPROFEN 400 MG/1
400 TABLET, FILM COATED ORAL EVERY 6 HOURS PRN
Qty: 30 TABLET | Refills: 0 | Status: SHIPPED | OUTPATIENT
Start: 2025-01-03

## 2025-01-03 RX ORDER — HYDROCODONE BITARTRATE AND ACETAMINOPHEN 5; 325 MG/1; MG/1
1 TABLET ORAL ONCE
Status: DISCONTINUED | OUTPATIENT
Start: 2025-01-03 | End: 2025-01-03 | Stop reason: HOSPADM

## 2025-01-03 RX ORDER — CYCLOBENZAPRINE HCL 5 MG
5 TABLET ORAL 3 TIMES DAILY PRN
Qty: 12 TABLET | Refills: 0 | Status: SHIPPED | OUTPATIENT
Start: 2025-01-03 | End: 2025-01-13

## 2025-01-03 RX ADMIN — KETOROLAC TROMETHAMINE 30 MG: 30 INJECTION, SOLUTION INTRAMUSCULAR at 16:15

## 2025-01-03 ASSESSMENT — PAIN DESCRIPTION - DESCRIPTORS
DESCRIPTORS: ACHING
DESCRIPTORS: THROBBING;SHARP

## 2025-01-03 ASSESSMENT — LIFESTYLE VARIABLES
HOW OFTEN DO YOU HAVE A DRINK CONTAINING ALCOHOL: NEVER
HOW MANY STANDARD DRINKS CONTAINING ALCOHOL DO YOU HAVE ON A TYPICAL DAY: PATIENT DOES NOT DRINK

## 2025-01-03 ASSESSMENT — PAIN DESCRIPTION - ORIENTATION
ORIENTATION: LEFT
ORIENTATION: LEFT

## 2025-01-03 ASSESSMENT — PAIN DESCRIPTION - LOCATION
LOCATION: HIP
LOCATION: HIP

## 2025-01-03 ASSESSMENT — PAIN DESCRIPTION - FREQUENCY: FREQUENCY: CONTINUOUS

## 2025-01-03 ASSESSMENT — PAIN - FUNCTIONAL ASSESSMENT: PAIN_FUNCTIONAL_ASSESSMENT: 0-10

## 2025-01-03 ASSESSMENT — PAIN SCALES - GENERAL
PAINLEVEL_OUTOF10: 8
PAINLEVEL_OUTOF10: 8

## 2025-01-03 NOTE — ED NOTES
Patient presents with c/o left hip pain x 2 days following what he describes was a \"pop\" in his hip 2 days ago at work.

## 2025-01-04 ASSESSMENT — ENCOUNTER SYMPTOMS
COLOR CHANGE: 0
ABDOMINAL PAIN: 0
VOMITING: 0
CHEST TIGHTNESS: 0
SHORTNESS OF BREATH: 0
BACK PAIN: 0
NAUSEA: 0
WHEEZING: 0

## 2025-01-04 NOTE — ED PROVIDER NOTES
[facility] EMERGENCY DEPARTMENT  Emergency Department Encounter  Emergency Medicine Attending     Pt Name:Rustam Neal  MRN: [id number]  Birthdate 1995  Date of evaluation: 1/4/25  PCP:  Marshal Rubio MD  Note Started: 1:32 PM EST      CHIEF COMPLAINT       Chief Complaint   Patient presents with    Hip Pain     Left, onset 2 days, felt a pop at work today       HISTORY OF PRESENT ILLNESS  (Location/Symptom, Timing/Onset, Context/Setting, Quality, Duration, Modifying Factors, Severity.)      29-year-old male presents for evaluation of left hip pain.  Patient states that he was at work today at [employer] and felt a pop today.  He has been experiencing left hip pain and left lower back pain over the last few days.  Denies any numbness.  Denies any weakness.  Denies any bowel or bladder incontinence.  No fevers or chills.  No falls.            PAST MEDICAL / SURGICAL / SOCIAL / FAMILY HISTORY      has a past medical history of Asthma.     has a past surgical history that includes [place] tooth extraction and Colonoscopy.    Social History     Socioeconomic History    Marital status: Single     Spouse name: Not on file    Number of children: Not on file    Years of education: Not on file    Highest education level: Not on file   Occupational History    Not on file   Tobacco Use    Smoking status: Former     Current packs/day: 0.75     Average packs/day: 0.8 packs/day for 20.0 years (15.0 ttl pk-yrs)     Types: E-Cigarettes, Cigarettes     Start date: 2005    Smokeless tobacco: Never   Vaping Use    Vaping status: Every Day    Start date: 7/1/2005    Substances: Nicotine, THC    Passive vaping exposure: Yes   Substance and Sexual Activity    Alcohol use: Not Currently     Comment: 1-3 times per week    Drug use: Not Currently     Frequency: 7.0 times per week     Types: Marijuana (Weed), Other-see comments     Comment: in recovery for crack cocaine    Sexual activity: Not Currently   Other Topics Concern  display    PROCEDURES:    Procedures    CONSULTS:  None        FINAL IMPRESSION      1. Left hip pain          DISPOSITION / PLAN     DISPOSITION Decision To Discharge 01/03/2025 04:26:31 PM   DISPOSITION CONDITION Stable           PATIENT REFERRED TO:  Marshal Rubio MD  [address]  [facility] [id number] [phone]          Obi Lindsey MD  [address]  [facility] [id number] [phone]    Schedule an appointment as soon as possible for a visit   for hip pain      DISCHARGE MEDICATIONS:  Discharge Medication List as of 1/3/2025  4:29 PM        START taking these medications    Details   cyclobenzaprine (FLEXERIL) 5 MG tablet Take 1 tablet by mouth 3 times daily as needed for Muscle spasms, Disp-12 tablet, R-0Normal             Tavares Díaz DO  Emergency Medicine Physician     (Please note that portions of thisnote were completed with a voice recognition program.  Efforts were made to edit the dictations but occasionally words are mis-transcribed.)        Tavares Díaz DO  01/04/25 0627

## 2025-02-17 ENCOUNTER — HOSPITAL ENCOUNTER (EMERGENCY)
Age: 30
Discharge: HOME | End: 2025-02-17
Payer: MEDICAID

## 2025-02-17 VITALS
HEART RATE: 66 BPM | TEMPERATURE: 98.1 F | OXYGEN SATURATION: 100 % | RESPIRATION RATE: 15 BRPM | SYSTOLIC BLOOD PRESSURE: 126 MMHG | HEIGHT: 69 IN | WEIGHT: 185 LBS | DIASTOLIC BLOOD PRESSURE: 66 MMHG | BODY MASS INDEX: 27.4 KG/M2

## 2025-02-17 DIAGNOSIS — L08.9 INFECTED SEBACEOUS CYST: Primary | ICD-10-CM

## 2025-02-17 DIAGNOSIS — L72.3 INFECTED SEBACEOUS CYST: Primary | ICD-10-CM

## 2025-02-17 PROCEDURE — 99283 EMERGENCY DEPT VISIT LOW MDM: CPT

## 2025-02-17 PROCEDURE — 6370000000 HC RX 637 (ALT 250 FOR IP): Performed by: NURSE PRACTITIONER

## 2025-02-17 RX ORDER — NAPROXEN 500 MG/1
500 TABLET ORAL 2 TIMES DAILY PRN
Qty: 14 TABLET | Refills: 0 | Status: SHIPPED | OUTPATIENT
Start: 2025-02-17

## 2025-02-17 RX ORDER — DOXYCYCLINE 100 MG/1
100 CAPSULE ORAL ONCE
Status: COMPLETED | OUTPATIENT
Start: 2025-02-17 | End: 2025-02-17

## 2025-02-17 RX ORDER — DOXYCYCLINE HYCLATE 100 MG
100 TABLET ORAL 2 TIMES DAILY
Qty: 20 TABLET | Refills: 0 | Status: SHIPPED | OUTPATIENT
Start: 2025-02-17 | End: 2025-02-27

## 2025-02-17 RX ADMIN — DOXYCYCLINE 100 MG: 100 CAPSULE ORAL at 21:00

## 2025-02-17 ASSESSMENT — ENCOUNTER SYMPTOMS
SHORTNESS OF BREATH: 0
TROUBLE SWALLOWING: 0
FACIAL SWELLING: 1
SORE THROAT: 0
COLOR CHANGE: 1

## 2025-02-18 NOTE — ED PROVIDER NOTES
through shared decision making. The testing that was ordered was discussed with the patient. Any medications that may have been ordered were discussed with the patient.     I have reviewed the patient's previous medical records using the electronic health record that we have available that were pertinent to today's visit.      Warm, moist compresses were discussed. Prescriptions were provided for naprosyn and doxycycline. Follow up with pcp and/or a surgeon for a recheck, further evaluation and treatment. Evaluation and treatment course in the ED, and plan of care upon discharge was discussed in length with the patient. Patient had no further questions prior to being discharged and was instructed to return to the ED for new or worsening symptoms.            FINAL IMPRESSION      1. Infected sebaceous cyst            DISPOSITION/PLAN   DISPOSITION Decision To Discharge 02/17/2025 08:43:50 PM   DISPOSITION CONDITION Stable           PATIENT REFERRED TO:   Marshal Rubio MD  [address] [address]  [phone]          Shira Talbert DO  [facility] [address]  [phone]          [facility] Emergency [facility]  [address] [address]  [phone]    If symptoms worsen, As needed    Papa Vail MD  [address]  [address]  [facility] [id number] [phone]            DISCHARGE MEDICATIONS:     Discharge Medication List as of 2/17/2025  9:07 PM        START taking these medications    Details   doxycycline hyclate (VIBRA-TABS) 100 MG tablet Take 1 tablet by mouth 2 times daily for 10 days, Disp-20 tablet, R-0Normal                 (Please note that portions of this note were completed with a voice recognition program.  Efforts were made to edit the dictations but occasionally words are mis-transcribed.)    SHAYLA Akbar CNP, Kristin M, APRN - CNP  02/17/25 [id number]

## 2025-02-18 NOTE — ED NOTES
Pt cyst on right side of face open, minimal drainage, pt wound cleaned and dressing applied to face

## 2025-05-26 ENCOUNTER — HOSPITAL ENCOUNTER (EMERGENCY)
Age: 30
Discharge: HOME | End: 2025-05-26
Attending: EMERGENCY MEDICINE
Payer: MEDICAID

## 2025-05-26 ENCOUNTER — APPOINTMENT (OUTPATIENT)
Dept: CT IMAGING | Age: 30
End: 2025-05-26
Payer: MEDICAID

## 2025-05-26 VITALS
HEART RATE: 71 BPM | TEMPERATURE: 97.5 F | DIASTOLIC BLOOD PRESSURE: 69 MMHG | WEIGHT: 190 LBS | BODY MASS INDEX: 28.14 KG/M2 | SYSTOLIC BLOOD PRESSURE: 107 MMHG | OXYGEN SATURATION: 100 % | RESPIRATION RATE: 16 BRPM | HEIGHT: 69 IN

## 2025-05-26 DIAGNOSIS — R10.84 GENERALIZED ABDOMINAL PAIN: ICD-10-CM

## 2025-05-26 DIAGNOSIS — R19.7 DIARRHEA, UNSPECIFIED TYPE: Primary | ICD-10-CM

## 2025-05-26 LAB
ALBUMIN SERPL-MCNC: 4.5 G/DL (ref 3.5–5.2)
ALBUMIN/GLOB SERPL: 1.5 {RATIO} (ref 1–2.5)
ALP SERPL-CCNC: 53 U/L (ref 40–129)
ALT SERPL-CCNC: 23 U/L (ref 10–50)
ANION GAP SERPL CALCULATED.3IONS-SCNC: 12 MMOL/L (ref 9–16)
AST SERPL-CCNC: 21 U/L (ref 10–50)
BASOPHILS # BLD: 0.03 K/UL (ref 0–0.2)
BASOPHILS NFR BLD: 1 % (ref 0–2)
BILIRUB DIRECT SERPL-MCNC: 0.6 MG/DL (ref 0–0.2)
BILIRUB INDIRECT SERPL-MCNC: 0.2 MG/DL
BILIRUB SERPL-MCNC: 0.7 MG/DL (ref 0–1.2)
BUN SERPL-MCNC: 22 MG/DL (ref 6–20)
CALCIUM SERPL-MCNC: 9.1 MG/DL (ref 8.6–10.4)
CHLORIDE SERPL-SCNC: 102 MMOL/L (ref 98–107)
CO2 SERPL-SCNC: 25 MMOL/L (ref 20–31)
CREAT SERPL-MCNC: 1.2 MG/DL (ref 0.7–1.2)
EOSINOPHIL # BLD: 0.14 K/UL (ref 0–0.44)
EOSINOPHILS RELATIVE PERCENT: 3 % (ref 1–4)
ERYTHROCYTE [DISTWIDTH] IN BLOOD BY AUTOMATED COUNT: 11.8 % (ref 11.8–14.4)
GFR, ESTIMATED: 84 ML/MIN/1.73M2
GLUCOSE SERPL-MCNC: 94 MG/DL (ref 74–99)
HCT VFR BLD AUTO: 41.7 % (ref 40.7–50.3)
HGB BLD-MCNC: 14.7 G/DL (ref 13–17)
IMM GRANULOCYTES # BLD AUTO: 0.01 K/UL (ref 0–0.3)
IMM GRANULOCYTES NFR BLD: 0 %
LIPASE SERPL-CCNC: 43 U/L (ref 13–60)
LYMPHOCYTES NFR BLD: 1.12 K/UL (ref 1.1–3.7)
LYMPHOCYTES RELATIVE PERCENT: 21 % (ref 24–43)
MCH RBC QN AUTO: 32 PG (ref 25.2–33.5)
MCHC RBC AUTO-ENTMCNC: 35.3 G/DL (ref 28.4–34.8)
MCV RBC AUTO: 90.7 FL (ref 82.6–102.9)
MONOCYTES NFR BLD: 0.36 K/UL (ref 0.1–1.2)
MONOCYTES NFR BLD: 7 % (ref 3–12)
NEUTROPHILS NFR BLD: 68 % (ref 36–65)
NEUTS SEG NFR BLD: 3.75 K/UL (ref 1.5–8.1)
NRBC BLD-RTO: 0 PER 100 WBC
PLATELET # BLD AUTO: 232 K/UL (ref 138–453)
PMV BLD AUTO: 9.6 FL (ref 8.1–13.5)
POTASSIUM SERPL-SCNC: 4.3 MMOL/L (ref 3.7–5.3)
PROT SERPL-MCNC: 7.4 G/DL (ref 6.6–8.7)
RBC # BLD AUTO: 4.6 M/UL (ref 4.21–5.77)
SODIUM SERPL-SCNC: 138 MMOL/L (ref 136–145)
WBC OTHER # BLD: 5.4 K/UL (ref 3.5–11.3)

## 2025-05-26 PROCEDURE — 6360000004 HC RX CONTRAST MEDICATION: Performed by: EMERGENCY MEDICINE

## 2025-05-26 PROCEDURE — 96360 HYDRATION IV INFUSION INIT: CPT

## 2025-05-26 PROCEDURE — 2580000003 HC RX 258: Performed by: PHYSICIAN ASSISTANT

## 2025-05-26 PROCEDURE — 80048 BASIC METABOLIC PNL TOTAL CA: CPT

## 2025-05-26 PROCEDURE — 80076 HEPATIC FUNCTION PANEL: CPT

## 2025-05-26 PROCEDURE — 99285 EMERGENCY DEPT VISIT HI MDM: CPT

## 2025-05-26 PROCEDURE — 74177 CT ABD & PELVIS W/CONTRAST: CPT

## 2025-05-26 PROCEDURE — 83690 ASSAY OF LIPASE: CPT

## 2025-05-26 PROCEDURE — 85025 COMPLETE CBC W/AUTO DIFF WBC: CPT

## 2025-05-26 PROCEDURE — 6360000002 HC RX W HCPCS: Performed by: PHYSICIAN ASSISTANT

## 2025-05-26 PROCEDURE — 2580000003 HC RX 258: Performed by: EMERGENCY MEDICINE

## 2025-05-26 PROCEDURE — 2500000003 HC RX 250 WO HCPCS: Performed by: EMERGENCY MEDICINE

## 2025-05-26 PROCEDURE — 96372 THER/PROPH/DIAG INJ SC/IM: CPT

## 2025-05-26 PROCEDURE — 96361 HYDRATE IV INFUSION ADD-ON: CPT

## 2025-05-26 RX ORDER — IOPAMIDOL 755 MG/ML
75 INJECTION, SOLUTION INTRAVASCULAR
Status: COMPLETED | OUTPATIENT
Start: 2025-05-26 | End: 2025-05-26

## 2025-05-26 RX ORDER — SODIUM CHLORIDE 0.9 % (FLUSH) 0.9 %
10 SYRINGE (ML) INJECTION PRN
Status: DISCONTINUED | OUTPATIENT
Start: 2025-05-26 | End: 2025-05-26 | Stop reason: HOSPADM

## 2025-05-26 RX ORDER — 0.9 % SODIUM CHLORIDE 0.9 %
100 INTRAVENOUS SOLUTION INTRAVENOUS ONCE
Status: COMPLETED | OUTPATIENT
Start: 2025-05-26 | End: 2025-05-26

## 2025-05-26 RX ORDER — DICYCLOMINE HYDROCHLORIDE 10 MG/ML
20 INJECTION INTRAMUSCULAR ONCE
Status: COMPLETED | OUTPATIENT
Start: 2025-05-26 | End: 2025-05-26

## 2025-05-26 RX ORDER — 0.9 % SODIUM CHLORIDE 0.9 %
1000 INTRAVENOUS SOLUTION INTRAVENOUS ONCE
Status: COMPLETED | OUTPATIENT
Start: 2025-05-26 | End: 2025-05-26

## 2025-05-26 RX ORDER — DICYCLOMINE HCL 20 MG
20 TABLET ORAL 4 TIMES DAILY
Qty: 28 TABLET | Refills: 0 | Status: SHIPPED | OUTPATIENT
Start: 2025-05-26 | End: 2025-06-02

## 2025-05-26 RX ADMIN — SODIUM CHLORIDE 100 ML: 9 INJECTION, SOLUTION INTRAVENOUS at 12:40

## 2025-05-26 RX ADMIN — DICYCLOMINE HYDROCHLORIDE 20 MG: 10 INJECTION, SOLUTION INTRAMUSCULAR at 11:43

## 2025-05-26 RX ADMIN — SODIUM CHLORIDE, PRESERVATIVE FREE 10 ML: 5 INJECTION INTRAVENOUS at 12:39

## 2025-05-26 RX ADMIN — IOPAMIDOL 75 ML: 755 INJECTION, SOLUTION INTRAVENOUS at 12:39

## 2025-05-26 RX ADMIN — SODIUM CHLORIDE 1000 ML: 0.9 INJECTION, SOLUTION INTRAVENOUS at 11:43

## 2025-05-26 ASSESSMENT — PAIN DESCRIPTION - ORIENTATION
ORIENTATION: RIGHT;LOWER
ORIENTATION: LOWER

## 2025-05-26 ASSESSMENT — PAIN DESCRIPTION - LOCATION
LOCATION: ABDOMEN
LOCATION: ABDOMEN

## 2025-05-26 ASSESSMENT — PAIN SCALES - GENERAL
PAINLEVEL_OUTOF10: 8
PAINLEVEL_OUTOF10: 8

## 2025-05-26 ASSESSMENT — PAIN DESCRIPTION - DESCRIPTORS
DESCRIPTORS: SHARP;POUNDING
DESCRIPTORS: POUNDING

## 2025-05-26 ASSESSMENT — PAIN - FUNCTIONAL ASSESSMENT: PAIN_FUNCTIONAL_ASSESSMENT: 0-10

## 2025-05-26 ASSESSMENT — PAIN DESCRIPTION - PAIN TYPE: TYPE: ACUTE PAIN

## 2025-05-26 NOTE — ED NOTES
Pt presents to ED for RLQ/mid lower abdominal pain for several days.  Pt describes pain as \"punching\" and intermittent sharp pains.  Pt reports hx of this for 2 yrs. States pain is normally throughout abdomen and not as severe.  States he has seen GI and has had testing but no official diagnosis.  Reports diarrhea but denies nausea/vomiting. States pain is constant and does not change after eating/drinking.

## 2025-05-27 NOTE — ED PROVIDER NOTES
[facility] EMERGENCY DEPARTMENT  [facility]      Pt Name: Rustam Neal  MRN: [id number]  Birthdate 1995  Date ofevaluation: 5/26/2025  Provider: Prem Dahl PA-C    CHIEF COMPLAINT       Chief Complaint   Patient presents with    Abdominal Pain     Undiagnosed stomach issue x 2 years; \"has constant punching stomach pain but today is worse\"    Diarrhea         HISTORY OF PRESENT ILLNESS  (Location/Symptom, Timing/Onset, Context/Setting, Quality, Duration, Modifying Factors, Severity.)   Rustam Neal is a 29 y.o. male who presents to the emergency department with abdominal pain and diarrhea.  Symptoms ongoing for the last 24 to 36 hours.  Patient has chronic abdominal pain and has had significant workup without any findings.      Nursing Notes were reviewed.    ALLERGIES     Pcn [penicillins]    CURRENT MEDICATIONS       Discharge Medication List as of 5/26/2025  1:42 PM        CONTINUE these medications which have NOT CHANGED    Details   naproxen (NAPROSYN) 500 MG tablet Take 1 tablet by mouth 2 times daily as needed for Pain, Disp-14 tablet, R-0Normal      ALBUTEROL IN Inhale into the lungs as neededHistorical Med      ibuprofen (IBU) 400 MG tablet Take 1 tablet by mouth every 6 hours as needed for Pain, Disp-30 tablet, R-0Normal      butalbital-acetaminophen-caffeine (FIORICET, ESGIC) -40 MG per tablet Take 1 tablet by mouth every 6 hours as needed for Headaches or Migraine, Disp-12 tablet, R-0Print      QUEtiapine (SEROQUEL) 100 MG tablet Take 100 mg by mouth nightlyHistorical Med      lamoTRIgine (LAMICTAL) 100 MG tablet Take 100 mg by mouth dailyHistorical Med      hydrOXYzine pamoate (VISTARIL) 25 MG capsule Take 25 mg by mouth 3 times daily as neededHistorical Med      busPIRone (BUSPAR) 10 MG tablet Take 10 mg by mouth 3 times dailyHistorical Med      albuterol sulfate HFA (VENTOLIN HFA) 108 (90 Base) MCG/ACT inhaler Inhale 2 puffs into the lungs